# Patient Record
Sex: FEMALE | Race: BLACK OR AFRICAN AMERICAN | URBAN - METROPOLITAN AREA
[De-identification: names, ages, dates, MRNs, and addresses within clinical notes are randomized per-mention and may not be internally consistent; named-entity substitution may affect disease eponyms.]

---

## 2017-05-16 ENCOUNTER — ED HISTORICAL/CONVERTED ENCOUNTER (OUTPATIENT)
Dept: OTHER | Age: 22
End: 2017-05-16

## 2017-06-25 ENCOUNTER — ED HISTORICAL/CONVERTED ENCOUNTER (OUTPATIENT)
Dept: OTHER | Age: 22
End: 2017-06-25

## 2018-04-25 ENCOUNTER — ED HISTORICAL/CONVERTED ENCOUNTER (OUTPATIENT)
Dept: OTHER | Age: 23
End: 2018-04-25

## 2020-05-18 LAB
PAP SMEAR, EXTERNAL: NEGATIVE
PAP SMEAR, EXTERNAL: NORMAL

## 2020-05-27 ENCOUNTER — VIRTUAL VISIT (OUTPATIENT)
Dept: ENDOCRINOLOGY | Age: 25
End: 2020-05-27

## 2020-05-27 DIAGNOSIS — E11.65 TYPE 2 DIABETES MELLITUS WITH HYPERGLYCEMIA, UNSPECIFIED WHETHER LONG TERM INSULIN USE (HCC): ICD-10-CM

## 2020-05-27 DIAGNOSIS — O24.419 GESTATIONAL DIABETES MELLITUS (GDM) IN FIRST TRIMESTER, GESTATIONAL DIABETES METHOD OF CONTROL UNSPECIFIED: Primary | ICD-10-CM

## 2020-05-27 DIAGNOSIS — O24.419 GESTATIONAL DIABETES MELLITUS (GDM) IN FIRST TRIMESTER, GESTATIONAL DIABETES METHOD OF CONTROL UNSPECIFIED: ICD-10-CM

## 2020-05-27 RX ORDER — PYRIDOXINE HCL (VITAMIN B6) 25 MG
TABLET ORAL
COMMUNITY
Start: 2020-05-18 | End: 2020-08-06

## 2020-05-27 RX ORDER — PROMETHAZINE HYDROCHLORIDE 25 MG/1
TABLET ORAL
COMMUNITY
Start: 2020-05-18 | End: 2020-08-06

## 2020-05-27 NOTE — PATIENT INSTRUCTIONS
[facility] Do not skip meals Do not eat in between meals Reduce carbs- pasta, rice, potatoes, bread Do not drink juices or sodas, even they are calorie zero or diet drinks Do not eat peanut butter Do not eat sugar free cookies and cakes Do not eat peaches, oranges, pineapples, raisins, grapes , canteloupe , honey dew and fruit medleys 
 
 
----------------------------------------------------------------------------------------------------------------- 
 
 
[employer] reli-on meter   And testing supplies Check blood sugars immediately before each meal and at bedtime

## 2020-05-27 NOTE — PROGRESS NOTES
1. Have you been to the ER, urgent care clinic since your last visit? No  Hospitalized since your last visit? No    2. Have you seen or consulted any other health care providers outside of the [address] [place] [address] since your last visit? Include any pap smears or colon screening.  No

## 2020-05-27 NOTE — PROGRESS NOTES
HISTORY OF PRESENT ILLNESS  Becca Herrera is a 31 y.o. female. HPI  Initial visit for type 2 diabetes / gestational diabetes   Referred by Dr. Saloni Das by pcp in 2011   Saw me then and her last visit was June 2012     She is diagnosed as type 2 diabetes ,  Then and never followed back since   She continued her care under pcp     She is thru  8 weeks of gestational age   LMP     H/o diabetes for 8 plus  years     Current A1C is ??  and symptoms/problems include none     Current diabetic medications include none. Current monitoring regimen: home blood tests - none   Home blood sugar records: trend: unknown   Any episodes of hypoglycemia? no    Weight trend: increasing steadily  Prior visit with dietician: no  Current diet: \"unhealthy\" diet in general  Current exercise: no regular exercise    Known diabetic complications: ?  Cardiovascular risk factors: dyslipidemia, diabetes mellitus, obesity    Eye exam current (within one year): no  CHERI: no     Past Medical History:   Diagnosis Date    Diabetes mellitus     IDDM (insulin dependent diabetes mellitus)     Weight loss      History reviewed. No pertinent surgical history. Current Outpatient Medications   Medication Sig    promethazine (PHENERGAN) 25 mg tablet TAKE 1 TABLET BY MOUTH EVERY 4 TO 6 HOURS AS NEEDED    Vitamin B-6 25 mg tablet TAKE ONE TABLET BY MOUTH TWICE DAILY    Lancets (ONE TOUCH DELICA) Misc 949 Packages by Does Not Apply route four (4) times daily.  glucose blood VI test strips (ONE TOUCH TEST) strip 200 Each by Does Not Apply route four (4) times daily.  Insulin Needles, Disposable, (BD INSULIN PEN NEEDLE UF SHORT) 31 X 5/16 \" Ndle 200 Packages by SubCUTAneous route four (4) times daily. No current facility-administered medications for this visit. Review of Systems   Constitutional: Negative. HENT: Negative. Eyes: Negative for pain and redness. Respiratory: Negative.     Cardiovascular: Negative for chest pain, palpitations and leg swelling. Gastrointestinal: Negative. Negative for constipation. Genitourinary: Negative. Musculoskeletal: Negative for myalgias. Skin: Negative. Neurological: Negative. Endo/Heme/Allergies: Negative. Psychiatric/Behavioral: Negative for depression and memory loss. The patient does not have insomnia. Physical Exam   Constitutional: oriented to person, place, and time. appears well-developed and well-nourished. HENT:   Head: Normocephalic. Eyes: normal , noted no swelling or redness. Neck: Normal range of motion. Cardiovascular: could nto be examined  Pulmonary/Chest: appears breathing effortlessly  Abdominal: Soft. Musculoskeletal: appears relatively nprmal  range of motion. Neurological: He is alert and oriented to person, place, and time. Appears to have no focal deficits    Psychiatric: He has a normal mood and affect. ASSESSMENT and PLAN    1. Poorly controlled gestational diabetes : Diabetes type 2 complicating the pregnancy   Discussed patho-physiology of DM  during pregnancy   She is educated about the importance of glycemic control for healthy baby and safe delivery. She is told to check blood sugars before meals and one hour after meals and at bed time on some days (2 hours post dinner). She is educated about the targets being different during pregnancy   Fasting below 90 mg and one hour post prandial being below 130 mg   She will attend the DTC education class. She will maintain  her log/meter for review. She is educated about possibility of worsening of retinopathy with aggressive glycemic control.       2.   Type 2 DM, poorly controlled :   Suspecting poor control of DM   - ordered a1c ASAP   - diet education provided   - Discussed DM 2 patho-physiology extensively   - ordered meter asap and use it with keeping glucose log   - Patient is advised about checking blood sugars 4 times a day and maintaining log book  - f/u sooner     lab results and schedule of future lab studies reviewed with patient  specific diabetic recommendations: diabetic diet discussed in detail, written exchange diet given, home glucose monitoring emphasized and annual eye examinations at Ophthalmology discussed      3. Hypoglycemia :  Educated on treating the hypoglycemia. Discussed Glucagon use and prescribed        Pursuant  To the Emergency declaration under the [address] and the [place] [id number] waiver authority and the [facility], to reduce the patient's risk of exposure to  COVID-19 and provide continuity of care for an established patient.

## 2020-05-28 LAB
ALBUMIN SERPL-MCNC: 4.1 G/DL (ref 3.9–5)
ALBUMIN/GLOB SERPL: 1.5 {RATIO} (ref 1.2–2.2)
ALP SERPL-CCNC: 67 IU/L (ref 39–117)
ALT SERPL-CCNC: 11 IU/L (ref 0–32)
AST SERPL-CCNC: 9 IU/L (ref 0–40)
BILIRUB SERPL-MCNC: <0.2 MG/DL (ref 0–1.2)
BUN SERPL-MCNC: 5 MG/DL (ref 6–20)
BUN/CREAT SERPL: 10 (ref 9–23)
CALCIUM SERPL-MCNC: 9.5 MG/DL (ref 8.7–10.2)
CHLORIDE SERPL-SCNC: 104 MMOL/L (ref 96–106)
CO2 SERPL-SCNC: 19 MMOL/L (ref 20–29)
CREAT SERPL-MCNC: 0.51 MG/DL (ref 0.57–1)
EST. AVERAGE GLUCOSE BLD GHB EST-MCNC: 217 MG/DL
GLOBULIN SER CALC-MCNC: 2.7 G/DL (ref 1.5–4.5)
GLUCOSE SERPL-MCNC: 119 MG/DL (ref 65–99)
HBA1C MFR BLD: 9.2 % (ref 4.8–5.6)
POTASSIUM SERPL-SCNC: 4.3 MMOL/L (ref 3.5–5.2)
PROT SERPL-MCNC: 6.8 G/DL (ref 6–8.5)
SODIUM SERPL-SCNC: 136 MMOL/L (ref 134–144)
TSH SERPL DL<=0.005 MIU/L-ACNC: 3.66 UIU/ML (ref 0.45–4.5)

## 2020-06-07 ENCOUNTER — DOCUMENTATION ONLY (OUTPATIENT)
Dept: ENDOCRINOLOGY | Age: 25
End: 2020-06-07

## 2020-06-07 NOTE — PROGRESS NOTES
pregnant , diabetic,   She has out of control, a1c  Over 9 %   I was waiting to see her with log and she missed a visit already     She has to be very compliant with follow ups - inform her   I dont think she made an appt

## 2020-06-15 LAB
ANTIBODY SCREEN, EXTERNAL: NEGATIVE
CHLAMYDIA, EXTERNAL: NEGATIVE
CYSTIC FIBROSIS, EXTERNAL: NEGATIVE
HBSAG, EXTERNAL: NEGATIVE
HCT, EXTERNAL: NORMAL
HGB EVAL, EXTERNAL: NORMAL
HGB, EXTERNAL: 9.6
HIV, EXTERNAL: NORMAL
N. GONORRHEA, EXTERNAL: NEGATIVE
PLATELET CNT,   EXTERNAL: 468
RPR, EXTERNAL: NORMAL
RUBELLA, EXTERNAL: NORMAL
TYPE, ABO & RH, EXTERNAL: NORMAL
URINALYSIS, EXTERNAL: NEGATIVE

## 2020-06-16 ENCOUNTER — VIRTUAL VISIT (OUTPATIENT)
Dept: ENDOCRINOLOGY | Age: 25
End: 2020-06-16

## 2020-06-16 DIAGNOSIS — E11.65 TYPE 2 DIABETES MELLITUS WITH HYPERGLYCEMIA, UNSPECIFIED WHETHER LONG TERM INSULIN USE (HCC): ICD-10-CM

## 2020-06-16 DIAGNOSIS — O24.419 GESTATIONAL DIABETES MELLITUS (GDM) IN FIRST TRIMESTER, GESTATIONAL DIABETES METHOD OF CONTROL UNSPECIFIED: Primary | ICD-10-CM

## 2020-06-16 DIAGNOSIS — O24.419 GESTATIONAL DIABETES MELLITUS (GDM) IN FIRST TRIMESTER, GESTATIONAL DIABETES METHOD OF CONTROL UNSPECIFIED: ICD-10-CM

## 2020-06-16 RX ORDER — INSULIN DETEMIR 100 [IU]/ML
INJECTION, SOLUTION SUBCUTANEOUS
Qty: 15 ML | Refills: 6 | Status: SHIPPED | OUTPATIENT
Start: 2020-06-16 | End: 2020-11-21

## 2020-06-16 RX ORDER — PEN NEEDLE, DIABETIC 30 GX3/16"
200 NEEDLE, DISPOSABLE MISCELLANEOUS 4 TIMES DAILY
Qty: 200 PACKAGE | Refills: 6 | Status: SHIPPED | OUTPATIENT
Start: 2020-06-16 | End: 2020-12-29

## 2020-06-16 RX ORDER — INSULIN ASPART 100 [IU]/ML
INJECTION, SOLUTION INTRAVENOUS; SUBCUTANEOUS
Qty: 15 ML | Refills: 6 | Status: SHIPPED | OUTPATIENT
Start: 2020-06-16 | End: 2020-11-21

## 2020-06-16 NOTE — PROGRESS NOTES
**THIS IS A VIRTUAL VISIT VIA AUDIO- VIDEO SYNCHRONOUS DISCUSSION. PATIENT AGREED TO HAVE THEIR CARE DELIVERED OVER A MYCHART/DOXY. ME VIDEO VISIT IN PLACE OF THEIR REGULARLY SCHEDULED OFFICE VISIT**   Pt  is aware that this is a billable encounter and is responsible for copays/deductibles   Patient gave a verbal consent to proceed with virtual video visit   Patient is at home and I, the provider,  am at the office care diabetes and endocrinology    HISTORY OF PRESENT ILLNESS  Gerson Robles is a 31 y.o. female. HPI  First follow up after  Initial visit for type 2 diabetes / gestational diabetes   Referred by Dr. Za Perez       She is thru 12 weeks of    GA   Had the first visit with her OB     Sugars are better than prior to pregnancy , but not in range for pregnancy goals           Old history  :    Referred by pcp in 2011   Saw jovani patel and her last visit was June 2012     She is diagnosed as type 2 diabetes ,  Then and never followed back since   She continued her care under pcp     She is thru  8 weeks of gestational age   LMP     H/o diabetes for 8 plus  years     Current A1C is ??  and symptoms/problems include none     Current diabetic medications include none. Current monitoring regimen: home blood tests - none   Home blood sugar records: trend: unknown   Any episodes of hypoglycemia? no    Weight trend: increasing steadily  Prior visit with dietician: no  Current diet: \"unhealthy\" diet in general  Current exercise: no regular exercise    Known diabetic complications: ?  Cardiovascular risk factors: dyslipidemia, diabetes mellitus, obesity    Eye exam current (within one year): no  CHERI: no     Past Medical History:   Diagnosis Date    Diabetes mellitus     IDDM (insulin dependent diabetes mellitus)     Weight loss      History reviewed. No pertinent surgical history.   Current Outpatient Medications   Medication Sig    promethazine (PHENERGAN) 25 mg tablet TAKE 1 TABLET BY MOUTH EVERY 4 TO 6 HOURS AS NEEDED    Vitamin B-6 25 mg tablet TAKE ONE TABLET BY MOUTH TWICE DAILY    Lancets (ONE TOUCH DELICA) Misc 454 Packages by Does Not Apply route four (4) times daily.  glucose blood VI test strips (ONE TOUCH TEST) strip 200 Each by Does Not Apply route four (4) times daily.  Insulin Needles, Disposable, (BD INSULIN PEN NEEDLE UF SHORT) 31 X 5/16 \" Ndle 200 Packages by SubCUTAneous route four (4) times daily. No current facility-administered medications for this visit. Review of Systems   Constitutional: Negative. HENT: Negative. Eyes: Negative for pain and redness. Respiratory: Negative. Cardiovascular: Negative for chest pain, palpitations and leg swelling. Gastrointestinal: Negative. Negative for constipation. Genitourinary: Negative. Musculoskeletal: Negative for myalgias. Skin: Negative. Neurological: Negative. Endo/Heme/Allergies: Negative. Psychiatric/Behavioral: Negative for depression and memory loss. The patient does not have insomnia. Physical Exam   Constitutional: oriented to person, place, and time. appears well-developed and well-nourished. HENT:   Head: Normocephalic. Eyes: normal , noted no swelling or redness. Neck: Normal range of motion. Cardiovascular: could nto be examined  Pulmonary/Chest: appears breathing effortlessly  Abdominal: Soft. Musculoskeletal: appears relatively nprmal  range of motion. Neurological: He is alert and oriented to person, place, and time. Appears to have no focal deficits    Psychiatric: He has a normal mood and affect.          Lab Results   Component Value Date/Time    Hemoglobin A1c 9.2 (H) 05/27/2020 04:19 PM    Glucose 119 (H) 05/27/2020 04:19 PM    Glucose  06/11/2012 01:09 PM    Creatinine 0.51 (L) 05/27/2020 04:19 PM      No results found for: CHOL, CHOLPOCT, HDL, LDL, LDLC, LDLCPOC, LDLCEXT, TRIGL, TGLPOCT, CHHD, CHHDX  Lab Results   Component Value Date/Time    ALT (SGPT) 11 05/27/2020 04:19 PM    Alk. phosphatase 67 05/27/2020 04:19 PM    Bilirubin, total <0.2 05/27/2020 04:19 PM    Albumin 4.1 05/27/2020 04:19 PM    Protein, total 6.8 05/27/2020 04:19 PM     Lab Results   Component Value Date/Time    GFR est non- 05/27/2020 04:19 PM    GFR est  05/27/2020 04:19 PM    Creatinine 0.51 (L) 05/27/2020 04:19 PM    BUN 5 (L) 05/27/2020 04:19 PM    Sodium 136 05/27/2020 04:19 PM    Potassium 4.3 05/27/2020 04:19 PM    Chloride 104 05/27/2020 04:19 PM    CO2 19 (L) 05/27/2020 04:19 PM             ASSESSMENT and PLAN    1. Poorly controlled gestational diabetes : Diabetes type 2 complicating the pregnancy     a1c is 9.2 % on recent labs  Fasting glucose - 130 to 150   And  Pre meal  117  To 145   Will start on basal bolus regimen ,  She has experience in taking shots in the past when she got diagnosed   She is to check 6 times a day   Discussed with her the insulin regimen   Asked her to maintain the log   She will be picking up the instructions today and start insulin today June 16 2020   Will ser in f/u in person in a week   Discussed patho-physiology of DM  during pregnancy   She is educated about the importance of glycemic control for healthy baby and safe delivery. She is told to check blood sugars before meals and one hour after meals and at bed time on some days (2 hours post dinner). She is educated about the targets being different during pregnancy   Fasting below 90 mg and one hour post prandial being below 130 mg   She will be scheduled for the  DTC education class. She will maintain  her log/meter for review. She is educated about possibility of worsening of retinopathy with aggressive glycemic control.       2. Type 2 DM, poorly controlled : A1c was over 9 %          3. Hypoglycemia :  Educated on treating the hypoglycemia.  Discussed Glucagon use and prescribed        Pursuant  To the Emergency declaration under the [address] and the [facility] authority and the Coronavirus Preparedness, to reduce the patient's risk of exposure to  COVID-19 and provide continuity of care for an established patient.

## 2020-06-16 NOTE — PATIENT INSTRUCTIONS
[address] [facility] Do not skip meals Do not eat in between meals Reduce carbs- pasta, rice, potatoes, bread Do not drink juices or sodas, even they are calorie zero or diet drinks Do not eat peanut butter Do not eat sugar free cookies and cakes Do not eat peaches, oranges, pineapples, raisins, grapes , canteloupe , honey dew and fruit medleys 
 
 
------------------------------------------------------------------------------------------GO OVER INSTRUCTIONS WITH PT WHEN SHE COMES IN TO   
 
 
[employer] reli-on meter   And testing supplies Check blood sugars immediately before each meal and at bedtime ALSO CHECK 2 HOURS POST B-FAST AND LUNCH BUT DO NOT USE INSULIN FOR THESE CHECKS Take  NPH  /  levemir  insulin  24  units  at bed time Take novolog  Insulin 6 units before breakfast,    4  units before lunch and 4 units before dinner. Also, add additional Novolog  as follows with meals  If blood sugars are[de-identified] 
 
130 - 170   mg 2  units 171-  220 mg 4 units 221- 270 mg 6 units 271-320 mg  8 units 321- 370 mg 10  units 371- 420 mg 12 units Call office if it goes over 400 mg Less than 70 mg NO INSULIN

## 2020-06-16 NOTE — PROGRESS NOTES
1. Have you been to the ER, urgent care clinic since your last visit[place] [facility] [place]  Hospitalized since your last visit? No    2. Have you seen or consulted any other health care providers outside of the [address] [place] [address] since your last visit? Include any pap smears or colon screening.  No

## 2020-06-24 ENCOUNTER — OFFICE VISIT (OUTPATIENT)
Dept: ENDOCRINOLOGY | Age: 25
End: 2020-06-24

## 2020-06-24 VITALS
RESPIRATION RATE: 18 BRPM | TEMPERATURE: 98.8 F | OXYGEN SATURATION: 100 % | BODY MASS INDEX: 41.42 KG/M2 | HEART RATE: 93 BPM | HEIGHT: 64 IN | SYSTOLIC BLOOD PRESSURE: 126 MMHG | WEIGHT: 242.6 LBS | DIASTOLIC BLOOD PRESSURE: 66 MMHG

## 2020-06-24 DIAGNOSIS — E11.65 TYPE 2 DIABETES MELLITUS WITH HYPERGLYCEMIA, UNSPECIFIED WHETHER LONG TERM INSULIN USE (HCC): ICD-10-CM

## 2020-06-24 DIAGNOSIS — O24.414 INSULIN CONTROLLED GESTATIONAL DIABETES MELLITUS (GDM) IN FIRST TRIMESTER: Primary | ICD-10-CM

## 2020-06-24 LAB — HBA1C MFR BLD HPLC: 7.4 %

## 2020-06-24 NOTE — PATIENT INSTRUCTIONS
[facility] Do not skip meals Do not eat in between meals Reduce carbs- pasta, rice, potatoes, bread Do not drink juices or sodas, even they are calorie zero or diet drinks Do not eat peanut butter Do not eat sugar free cookies and cakes Do not eat peaches, oranges, pineapples, raisins, grapes , canteloupe , honey dew and fruit medleys 
 
 
------------------------------------------------------------------------------------------ 
[employer] reli[employer]on meter   And testing supplies Check blood sugars immediately before each meal and at bedtime ALSO CHECK 2 HOURS POST B-FAST AND LUNCH BUT DO NOT USE INSULIN FOR THESE CHECKS Take  NPH  /  levemir  insulin  24  units  at bed time Take novolog  Insulin 6 units before breakfast,    4  units before lunch and 4 units before dinner. Also, add additional Novolog  as follows with meals  If blood sugars are[de-identified] 
 
130 - 170   mg 2  units 171-  220 mg 4 units 221- 270 mg 6 units 271-320 mg  8 units 321- 370 mg 10  units 371- 420 mg 12 units Call office if it goes over 400 mg Less than 70 mg NO INSULIN

## 2020-06-24 NOTE — PROGRESS NOTES
1. Have you been to the ER, urgent care clinic since your last visit? No  Hospitalized since your last visit? No    2. Have you seen or consulted any other health care providers outside of the [address] [place] [address] since your last visit? Include any pap smears or colon screening. No    Wt Readings from Last 3 Encounters:   06/24/20 242 lb 9.6 oz (110 kg)   06/11/12 226 lb (102.5 kg) (99 %, Z= 2.26)*   11/04/11 222 lb 6.4 oz (100.9 kg) (99 %, Z= 2.23)*     * Growth percentiles are based on [facility] (Girls, 2-20 Years) data. Temp Readings from Last 3 Encounters:   06/24/20 98.8 °F (37.1 °C) (Oral)     BP Readings from Last 3 Encounters:   06/24/20 126/66   06/11/12 120/67   11/04/11 110/65 (47 %, Z = -0.09 /  46 %, Z = -0.10)*     *BP percentiles are based on the 2017 AAP Clinical Practice Guideline for girls     Pulse Readings from Last 3 Encounters:   06/24/20 93   06/11/12 75   11/04/11 77     Lab Results   Component Value Date/Time    Hemoglobin A1c 9.2 (H) 05/27/2020 04:19 PM    Hemoglobin A1c (POC) 6.3 11/04/2011 03:00 PM     Patient has logbook today.

## 2020-06-25 NOTE — PROGRESS NOTES
HISTORY OF PRESENT ILLNESS  Belen Jcakson is a 31 y.o. female. Second  follow up after  Initial visit for type 2 diabetes / gestational diabetes   Referred by Dr. Ave Lozano       She is thru 17 weeks of    GA   Due date in dec 2020    Sugars are definitely better since on insulin          Old history  :    Referred by pcp in 2011   Saw me then and her last visit was June 2012     She is diagnosed as type 2 diabetes ,  Then and never followed back since   She continued her care under pcp     She is thru  8 weeks of gestational age   LMP     H/o diabetes for 8 plus  years     Current A1C is ??  and symptoms/problems include none     Current diabetic medications include none. Current monitoring regimen: home blood tests - none   Home blood sugar records: trend: unknown   Any episodes of hypoglycemia? no    Weight trend: increasing steadily  Prior visit with dietician: no  Current diet: \"unhealthy\" diet in general  Current exercise: no regular exercise    Known diabetic complications: ?  Cardiovascular risk factors: dyslipidemia, diabetes mellitus, obesity    Eye exam current (within one year): no  CHERI: no     Past Medical History:   Diagnosis Date    Diabetes mellitus     IDDM (insulin dependent diabetes mellitus)     Weight loss      History reviewed. No pertinent surgical history. Current Outpatient Medications   Medication Sig    Insulin Needles, Disposable, (BD Ultra-Fine Short Pen Needle) 31 gauge x 5/16\" ndle 200 Packages by SubCUTAneous route four (4) times daily.  insulin detemir U-100 (Levemir FlexTouch U-100 Insuln) 100 unit/mL (3 mL) inpn Inject 24 units at bedtime    insulin aspart U-100 (NovoLOG Flexpen U-100 Insulin) 100 unit/mL (3 mL) inpn Inject 6 units before breakfast, and 4 units before lunch and dinner.  Plus sliding scale to max 50 units daily    promethazine (PHENERGAN) 25 mg tablet TAKE 1 TABLET BY MOUTH EVERY 4 TO 6 HOURS AS NEEDED    Vitamin B-6 25 mg tablet TAKE ONE TABLET BY MOUTH TWICE DAILY    Lancets (ONE TOUCH DELICA) Misc 590 Packages by Does Not Apply route four (4) times daily.  glucose blood VI test strips (ONE TOUCH TEST) strip 200 Each by Does Not Apply route four (4) times daily. No current facility-administered medications for this visit. Review of Systems   Constitutional: Negative. HENT: Negative. Eyes: Negative for pain and redness. Respiratory: Negative. Cardiovascular: Negative for chest pain, palpitations and leg swelling. Gastrointestinal: Negative. Negative for constipation. Genitourinary: Negative. Musculoskeletal: Negative for myalgias. Skin: Negative. Neurological: Negative. Endo/Heme/Allergies: Negative. Psychiatric/Behavioral: Negative for depression and memory loss. The patient does not have insomnia. Physical Exam   Constitutional: oriented to person, place, and time. appears well-developed and well-nourished. HENT:   Head: Normocephalic. Eyes: normal , noted no swelling or redness. Neck: Normal range of motion. Cardiovascular: could nto be examined  Pulmonary/Chest: appears breathing effortlessly  Abdominal: Soft. Musculoskeletal: appears relatively nprmal  range of motion. Neurological: He is alert and oriented to person, place, and time. Appears to have no focal deficits    Psychiatric: He has a normal mood and affect. Lab Results   Component Value Date/Time    Hemoglobin A1c 9.2 (H) 05/27/2020 04:19 PM    Glucose 119 (H) 05/27/2020 04:19 PM    Glucose  06/11/2012 01:09 PM    Creatinine 0.51 (L) 05/27/2020 04:19 PM      No results found for: CHOL, CHOLPOCT, HDL, LDL, LDLC, LDLCPOC, LDLCEXT, TRIGL, TGLPOCT, CHHD, CHHDX  Lab Results   Component Value Date/Time    ALT (SGPT) 11 05/27/2020 04:19 PM    Alk.  phosphatase 67 05/27/2020 04:19 PM    Bilirubin, total <0.2 05/27/2020 04:19 PM    Albumin 4.1 05/27/2020 04:19 PM    Protein, total 6.8 05/27/2020 04:19 PM     Lab Results   Component Value Date/Time    GFR est non- 05/27/2020 04:19 PM    GFR est  05/27/2020 04:19 PM    Creatinine 0.51 (L) 05/27/2020 04:19 PM    BUN 5 (L) 05/27/2020 04:19 PM    Sodium 136 05/27/2020 04:19 PM    Potassium 4.3 05/27/2020 04:19 PM    Chloride 104 05/27/2020 04:19 PM    CO2 19 (L) 05/27/2020 04:19 PM             ASSESSMENT and PLAN    1. Poorly controlled gestational diabetes : Diabetes type 2 complicating the pregnancy   a1c is 9.2 % on May labs   a1c is  By POC  7.4 %    Today  June 24 2020   Reviewed  Her log  Fasting glucose - mostly 90 to 110    And  Pre meal  117  To 130  Continuing on basal bolus regimen ,  She is to check 6 times a day   She will  maintain the log   She will be picking up the instructions today and start insulin today June 16 2020   Will ser in f/u in person in a week   Discussed patho-physiology of DM  during pregnancy   She is educated about the importance of glycemic control for healthy baby and safe delivery. She is told to check blood sugars before meals and one hour after meals and at bed time on some days (2 hours post dinner). She is educated about the targets being different during pregnancy   Fasting below 90 mg and one hour post prandial being below 130 mg     She will maintain  her log/meter for review. She is educated about possibility of worsening of retinopathy with aggressive glycemic control.       2.   Type 2 DM, poorly controlled : A1c was over 9 % around the time of conception   Pt is aware of  Risk inolved - maternal and fetal         > 50 % of time is spent on counseling   Patient voiced understanding her plan of care

## 2020-07-22 DIAGNOSIS — E11.65 TYPE 2 DIABETES MELLITUS WITH HYPERGLYCEMIA, UNSPECIFIED WHETHER LONG TERM INSULIN USE (HCC): ICD-10-CM

## 2020-07-22 DIAGNOSIS — O24.414 INSULIN CONTROLLED GESTATIONAL DIABETES MELLITUS (GDM) IN FIRST TRIMESTER: ICD-10-CM

## 2020-07-28 LAB
AFP ADJ MOM SERPL: 1.72
AFP INTERP SERPL-IMP: NORMAL
AFP INTERP SERPL-IMP: NORMAL
AFP SERPL-MCNC: 35.1 NG/ML
AGE AT DELIVERY: 26.9 YR
COMMENT, 018013: NORMAL
GA METHOD: NORMAL
GA: 15.7 WEEKS
IDDM PATIENT QL: YES
MULTIPLE PREGNANCY: NO
NEURAL TUBE DEFECT RISK FETUS: 926 %
RESULTS, 017004: NORMAL

## 2020-07-31 VITALS
SYSTOLIC BLOOD PRESSURE: 118 MMHG | BODY MASS INDEX: 43.62 KG/M2 | DIASTOLIC BLOOD PRESSURE: 68 MMHG | WEIGHT: 246.2 LBS | HEIGHT: 63 IN

## 2020-07-31 PROBLEM — Z34.90 PREGNANT: Status: ACTIVE | Noted: 2020-07-31

## 2020-07-31 PROBLEM — E28.2 POLYCYSTIC OVARY SYNDROME: Status: ACTIVE | Noted: 2020-07-31

## 2020-07-31 PROBLEM — O99.019 ANEMIA OF PREGNANCY: Status: ACTIVE | Noted: 2020-07-31

## 2020-07-31 PROBLEM — R11.2 NAUSEA AND VOMITING: Status: ACTIVE | Noted: 2020-07-31

## 2020-07-31 PROBLEM — A07.8 INTESTINAL INFECTION BY TRICHOMONAS VAGINALIS: Status: ACTIVE | Noted: 2020-07-31

## 2020-07-31 PROBLEM — Z67.91 RHD NEGATIVE: Status: ACTIVE | Noted: 2020-07-31

## 2020-07-31 RX ORDER — METRONIDAZOLE 500 MG/1
TABLET ORAL 3 TIMES DAILY
COMMUNITY
End: 2020-08-06

## 2020-07-31 RX ORDER — SYRINGE WITH NEEDLE, INSULIN
SYRINGE, EMPTY DISPOSABLE MISCELLANEOUS
COMMUNITY
End: 2021-01-22

## 2020-07-31 RX ORDER — LANOLIN ALCOHOL/MO/W.PET/CERES
CREAM (GRAM) TOPICAL
COMMUNITY
End: 2021-01-22

## 2020-07-31 RX ORDER — MUPIROCIN CALCIUM 20 MG/G
CREAM TOPICAL 2 TIMES DAILY
COMMUNITY
End: 2020-09-08

## 2020-07-31 RX ORDER — AMOXICILLIN 500 MG/1
500 CAPSULE ORAL
COMMUNITY
End: 2020-08-06

## 2020-07-31 RX ORDER — OXYCODONE AND ACETAMINOPHEN 5; 325 MG/1; MG/1
TABLET ORAL
COMMUNITY
End: 2020-08-06

## 2020-07-31 RX ORDER — TERCONAZOLE 8 MG/G
1 CREAM VAGINAL
COMMUNITY
End: 2020-08-06

## 2020-07-31 RX ORDER — DOCUSATE SODIUM 100 MG/1
100 CAPSULE, LIQUID FILLED ORAL 2 TIMES DAILY
COMMUNITY
End: 2020-12-29

## 2020-08-01 VITALS
BODY MASS INDEX: 43.59 KG/M2 | DIASTOLIC BLOOD PRESSURE: 68 MMHG | WEIGHT: 246 LBS | HEIGHT: 63 IN | SYSTOLIC BLOOD PRESSURE: 118 MMHG

## 2020-08-01 NOTE — PROGRESS NOTES
06/15/2020 12 wks 1 days [email or web address] 170 by u/s Present Yes Other (see comments) 0cm / 0% / -3 130/76 sitting 243.4 lbs With clothes   Comments: Fetal flutter. Denies LOF/VB. +Cramping-daily. SVE closed. Urine cx along with Ob panel today. Encouraged hydration. Per DM2, states Hba1c was 9% with her endocrinologist (Dr Marjorie Muñiz) 2 wks ago. MFM referral placed. Discussed she will likely need insulin management. Trich JENNIFER today. Partner was tested and is awaiting his results. She has not had unprotected intercourse since treatment. 07/10/2020 15 wks 5 days [email or web address] 170 by u/s Present No Other (see comments) 118/68 sitting 246.2 lbs With clothes   Comments: No FM. Denies LOF/VB/CTXs. Per DM, fasting FSGs- 100s, highest 115, 2hr PPs 120s. Saw endocrinologis and insulin was initiated. She is on Levemir 24u qhs and Novolog sliding scale, 6u qam, 4units at lunch/dinner. She has to send her updated log to the doctor today. Yet to get MFM appt. Nursing staff notified. AFP ordered. Anatomy scan will be with MFM. She does have some vaginal discharge since taking antibiotics for a toe infection. Will presumptively treat for yeast while awaiting culture results.

## 2020-08-06 ENCOUNTER — ROUTINE PRENATAL (OUTPATIENT)
Dept: OBGYN CLINIC | Age: 25
End: 2020-08-06
Payer: MEDICAID

## 2020-08-06 VITALS
WEIGHT: 254 LBS | BODY MASS INDEX: 45 KG/M2 | DIASTOLIC BLOOD PRESSURE: 74 MMHG | HEIGHT: 63 IN | SYSTOLIC BLOOD PRESSURE: 126 MMHG

## 2020-08-06 DIAGNOSIS — O99.012 ANEMIA DURING PREGNANCY IN SECOND TRIMESTER: ICD-10-CM

## 2020-08-06 DIAGNOSIS — Z67.91 RHD NEGATIVE: ICD-10-CM

## 2020-08-06 DIAGNOSIS — E11.9 TYPE 2 DIABETES MELLITUS WITHOUT COMPLICATION, UNSPECIFIED WHETHER LONG TERM INSULIN USE (HCC): ICD-10-CM

## 2020-08-06 DIAGNOSIS — O09.92 HIGH-RISK PREGNANCY IN SECOND TRIMESTER: ICD-10-CM

## 2020-08-06 DIAGNOSIS — A59.01 TRICHOMONAS VAGINALIS (TV) INFECTION: Primary | ICD-10-CM

## 2020-08-06 DIAGNOSIS — Z3A.19 19 WEEKS GESTATION OF PREGNANCY: ICD-10-CM

## 2020-08-06 PROCEDURE — 0502F SUBSEQUENT PRENATAL CARE: CPT | Performed by: OBSTETRICS & GYNECOLOGY

## 2020-08-06 NOTE — PROGRESS NOTES
AFP neg. Yet to feel fetal movement. Denies LOF/VB/CTXs. Had pelvic pressure that has since resolved. On same insulin regimen as before. Fasting FSGs 90s, < 95. 2HR PP 100s. Has MFM appt 8/10/2020. Will need to return JENNIFER in 1-2 wks for Trichomonas.

## 2020-08-10 ENCOUNTER — ROUTINE PRENATAL (OUTPATIENT)
Dept: OBGYN CLINIC | Age: 25
End: 2020-08-10
Payer: MEDICAID

## 2020-08-10 VITALS
SYSTOLIC BLOOD PRESSURE: 130 MMHG | BODY MASS INDEX: 45 KG/M2 | WEIGHT: 254 LBS | HEIGHT: 63 IN | DIASTOLIC BLOOD PRESSURE: 78 MMHG

## 2020-08-10 DIAGNOSIS — A59.01 TRICHOMONAS VAGINALIS (TV) INFECTION: Primary | ICD-10-CM

## 2020-08-10 PROCEDURE — 0502F SUBSEQUENT PRENATAL CARE: CPT | Performed by: OBSTETRICS & GYNECOLOGY

## 2020-08-10 NOTE — PROGRESS NOTES
Here for Trichomonas test of cure. Has completed medication (3-4 wks)  and so has partner. She has not had intercourse since then. She has seen MFM today. She states she notices her heart pounding sometimes. Denies fainting or dizziness or palpitations. Cont to monitor as heart rate nml for preg.

## 2020-08-14 LAB — T VAGINALIS DNA SPEC QL NAA+PROBE: NEGATIVE

## 2020-08-18 ENCOUNTER — TELEPHONE (OUTPATIENT)
Dept: OBGYN CLINIC | Age: 25
End: 2020-08-18

## 2020-08-18 NOTE — TELEPHONE ENCOUNTER
Called patient and she stated that she was having back pain and some cramping since she returned to work on Saturday. Advised patient to drink plenty of water and to take tylenol for pain. She also stated that she has started with a vaginal discharge. I informed her to call back to the office in the morning if not better so we can evaluate her and to make sure that she is not starting with an infection.

## 2020-09-08 ENCOUNTER — ROUTINE PRENATAL (OUTPATIENT)
Dept: OBGYN CLINIC | Age: 25
End: 2020-09-08
Payer: MEDICAID

## 2020-09-08 VITALS
HEIGHT: 63 IN | DIASTOLIC BLOOD PRESSURE: 80 MMHG | BODY MASS INDEX: 46.78 KG/M2 | WEIGHT: 264 LBS | HEART RATE: 102 BPM | SYSTOLIC BLOOD PRESSURE: 141 MMHG

## 2020-09-08 DIAGNOSIS — O09.92 HIGH-RISK PREGNANCY IN SECOND TRIMESTER: Primary | ICD-10-CM

## 2020-09-08 DIAGNOSIS — Z3A.24 24 WEEKS GESTATION OF PREGNANCY: ICD-10-CM

## 2020-09-08 DIAGNOSIS — E11.9 TYPE 2 DIABETES MELLITUS WITHOUT COMPLICATION, UNSPECIFIED WHETHER LONG TERM INSULIN USE (HCC): ICD-10-CM

## 2020-09-08 DIAGNOSIS — O99.012 ANEMIA DURING PREGNANCY IN SECOND TRIMESTER: ICD-10-CM

## 2020-09-08 DIAGNOSIS — Z67.91 RHD NEGATIVE: ICD-10-CM

## 2020-09-08 PROBLEM — A59.01 TRICHOMONAS VAGINALIS (TV) INFECTION: Status: RESOLVED | Noted: 2020-07-31 | Resolved: 2020-09-08

## 2020-09-08 PROCEDURE — 0502F SUBSEQUENT PRENATAL CARE: CPT | Performed by: OBSTETRICS & GYNECOLOGY

## 2020-09-08 NOTE — PROGRESS NOTES
+FM. Denies LOF/VB. Has some pelvic cramping when she goes to work at [employer] but it dissipates with rest. She will bring in accomodation paperwork. Per DM2, she is on ISS, Novolog 6u am, 4u lunch/dinner, Levemir 24u qhs. Fasting FSGs: 90s, max 100. 2hr PP: <120s. She has upcoming endocrine appt. Check CBC at next visit. Needs Rhogam at 28wga.

## 2020-09-16 ENCOUNTER — NURSE TRIAGE (OUTPATIENT)
Dept: OBGYN CLINIC | Age: 25
End: 2020-09-16

## 2020-10-08 ENCOUNTER — ROUTINE PRENATAL (OUTPATIENT)
Dept: OBGYN CLINIC | Age: 25
End: 2020-10-08
Payer: MEDICAID

## 2020-10-08 VITALS
WEIGHT: 267 LBS | HEIGHT: 63 IN | DIASTOLIC BLOOD PRESSURE: 76 MMHG | SYSTOLIC BLOOD PRESSURE: 132 MMHG | BODY MASS INDEX: 47.31 KG/M2 | HEART RATE: 102 BPM

## 2020-10-08 DIAGNOSIS — Z67.91 RHD NEGATIVE: ICD-10-CM

## 2020-10-08 DIAGNOSIS — O09.93 HIGH-RISK PREGNANCY IN THIRD TRIMESTER: ICD-10-CM

## 2020-10-08 DIAGNOSIS — Z79.4 TYPE 2 DIABETES MELLITUS WITHOUT COMPLICATION, WITH LONG-TERM CURRENT USE OF INSULIN (HCC): ICD-10-CM

## 2020-10-08 DIAGNOSIS — B37.31 YEAST VAGINITIS: ICD-10-CM

## 2020-10-08 DIAGNOSIS — O99.013 ANEMIA DURING PREGNANCY IN THIRD TRIMESTER: ICD-10-CM

## 2020-10-08 DIAGNOSIS — E11.9 TYPE 2 DIABETES MELLITUS WITHOUT COMPLICATION, WITH LONG-TERM CURRENT USE OF INSULIN (HCC): ICD-10-CM

## 2020-10-08 DIAGNOSIS — N89.8 VAGINAL DISCHARGE: ICD-10-CM

## 2020-10-08 DIAGNOSIS — Z34.93 PRENATAL CARE IN THIRD TRIMESTER: Primary | ICD-10-CM

## 2020-10-08 PROCEDURE — 96372 THER/PROPH/DIAG INJ SC/IM: CPT | Performed by: OBSTETRICS & GYNECOLOGY

## 2020-10-08 PROCEDURE — 0502F SUBSEQUENT PRENATAL CARE: CPT | Performed by: OBSTETRICS & GYNECOLOGY

## 2020-10-08 RX ORDER — ACETAMINOPHEN 500 MG
TABLET ORAL
COMMUNITY
Start: 2020-10-03 | End: 2020-12-29

## 2020-10-08 RX ORDER — TERCONAZOLE 4 MG/G
1 CREAM VAGINAL
Qty: 45 G | Refills: 2 | Status: SHIPPED | OUTPATIENT
Start: 2020-10-08 | End: 2020-10-15

## 2020-10-08 NOTE — PROGRESS NOTES
She complains of brown vaginal discharge x 1 month. +Vulvar swelling. +Vulvar pruritus. She has some vaginal burning and noticed some spots of blood last week post itching. Denies LOF/VB/CTXs. +FM. Speculum exam: thick yellow tinged discharge at os; os closed. Swab obtained. SVE closed. Per DM2, she is on ISS, Novolog 6u am, 4u lunch/dinner, Levemir 24u qhs. Fasting FSGs are 85 on average; 2hr PP 90-110s. She is taking iron. Will check CBC. She will get Tdap anf fluc vaccines at pharmacy and Rhogam in office today.

## 2020-10-09 LAB
BASOPHILS # BLD AUTO: 0 X10E3/UL (ref 0–0.2)
BASOPHILS NFR BLD AUTO: 0 %
EOSINOPHIL # BLD AUTO: 0.1 X10E3/UL (ref 0–0.4)
EOSINOPHIL NFR BLD AUTO: 2 %
ERYTHROCYTE [DISTWIDTH] IN BLOOD BY AUTOMATED COUNT: 14.5 % (ref 11.7–15.4)
HCT VFR BLD AUTO: 30.9 % (ref 34–46.6)
HGB BLD-MCNC: 9.8 G/DL (ref 11.1–15.9)
IMM GRANULOCYTES # BLD AUTO: 0 X10E3/UL (ref 0–0.1)
IMM GRANULOCYTES NFR BLD AUTO: 0 %
LYMPHOCYTES # BLD AUTO: 2.4 X10E3/UL (ref 0.7–3.1)
LYMPHOCYTES NFR BLD AUTO: 25 %
MCH RBC QN AUTO: 23 PG (ref 26.6–33)
MCHC RBC AUTO-ENTMCNC: 31.7 G/DL (ref 31.5–35.7)
MCV RBC AUTO: 72 FL (ref 79–97)
MONOCYTES # BLD AUTO: 0.7 X10E3/UL (ref 0.1–0.9)
MONOCYTES NFR BLD AUTO: 7 %
NEUTROPHILS # BLD AUTO: 6.4 X10E3/UL (ref 1.4–7)
NEUTROPHILS NFR BLD AUTO: 66 %
PLATELET # BLD AUTO: 448 X10E3/UL (ref 150–450)
RBC # BLD AUTO: 4.27 X10E6/UL (ref 3.77–5.28)
WBC # BLD AUTO: 9.6 X10E3/UL (ref 3.4–10.8)

## 2020-10-13 LAB
A VAGINAE DNA VAG QL NAA+PROBE: ABNORMAL SCORE
BVAB2 DNA VAG QL NAA+PROBE: ABNORMAL SCORE
C ALBICANS DNA VAG QL NAA+PROBE: POSITIVE
C GLABRATA DNA VAG QL NAA+PROBE: NEGATIVE
C KRUSEI DNA VAG QL NAA+PROBE: NEGATIVE
C LUSITANIAE DNA VAG QL NAA+PROBE: NEGATIVE
C TRACH DNA VAG QL NAA+PROBE: NEGATIVE
CANDIDA DNA VAG QL NAA+PROBE: NEGATIVE
MEGA1 DNA VAG QL NAA+PROBE: ABNORMAL SCORE
N GONORRHOEA DNA VAG QL NAA+PROBE: NEGATIVE
T VAGINALIS DNA VAG QL NAA+PROBE: NEGATIVE

## 2020-10-19 ENCOUNTER — NURSE TRIAGE (OUTPATIENT)
Dept: OBGYN CLINIC | Age: 25
End: 2020-10-19

## 2020-10-19 ENCOUNTER — TELEPHONE (OUTPATIENT)
Dept: OBGYN CLINIC | Age: 25
End: 2020-10-19

## 2020-10-19 RX ORDER — TERCONAZOLE 8 MG/G
1 CREAM VAGINAL
Qty: 20 G | Refills: 2 | Status: SHIPPED | OUTPATIENT
Start: 2020-10-19 | End: 2020-10-22

## 2020-10-19 NOTE — TELEPHONE ENCOUNTER
I returned patient's call and she was told that swab is showing some yeast.  I told her that Dr Dallin Powers has yet to review these results and she will receive a call for treatment instruction.   Patient told me that Dr Dallin Powers has already sent in [place] [phone] for her yeast.

## 2020-10-22 ENCOUNTER — ROUTINE PRENATAL (OUTPATIENT)
Dept: OBGYN CLINIC | Age: 25
End: 2020-10-22
Payer: MEDICAID

## 2020-10-22 VITALS
HEIGHT: 63 IN | SYSTOLIC BLOOD PRESSURE: 143 MMHG | WEIGHT: 271 LBS | DIASTOLIC BLOOD PRESSURE: 77 MMHG | HEART RATE: 98 BPM | BODY MASS INDEX: 48.02 KG/M2

## 2020-10-22 DIAGNOSIS — Z79.4 TYPE 2 DIABETES MELLITUS WITHOUT COMPLICATION, WITH LONG-TERM CURRENT USE OF INSULIN (HCC): ICD-10-CM

## 2020-10-22 DIAGNOSIS — Z3A.30 30 WEEKS GESTATION OF PREGNANCY: ICD-10-CM

## 2020-10-22 DIAGNOSIS — O09.93 HIGH-RISK PREGNANCY IN THIRD TRIMESTER: Primary | ICD-10-CM

## 2020-10-22 DIAGNOSIS — E11.9 TYPE 2 DIABETES MELLITUS WITHOUT COMPLICATION, WITH LONG-TERM CURRENT USE OF INSULIN (HCC): ICD-10-CM

## 2020-10-22 DIAGNOSIS — R03.0 ELEVATED BLOOD PRESSURE READING WITHOUT DIAGNOSIS OF HYPERTENSION: ICD-10-CM

## 2020-10-22 PROCEDURE — 0502F SUBSEQUENT PRENATAL CARE: CPT | Performed by: OBSTETRICS & GYNECOLOGY

## 2020-10-22 NOTE — PROGRESS NOTES
+FM. Denies LOF/VB/CTXs. Per DM2, she is on ISS, Novolog 6u am, 4u lunch/dinner, Levemir 24u qhs. Fasting FSGs are 100 on average; 2hr PP 120s or less. She states MFM said baby is measuring large for gestational age, awaiting report. They are trying change in diet and exercise before adjusting dosing of insulin. S/p Rhogam. S/p flu and Tdap vaccine. She states MFM said baby is measuring large for gestational age, awaiting report. Pt may be developing HTN. Will monitor.

## 2020-10-25 PROBLEM — O36.63X0 EXCESSIVE FETAL GROWTH AFFECTING MANAGEMENT OF PREGNANCY IN THIRD TRIMESTER: Status: ACTIVE | Noted: 2020-10-25

## 2020-11-06 ENCOUNTER — TELEPHONE (OUTPATIENT)
Dept: OBGYN CLINIC | Age: 25
End: 2020-11-06

## 2020-11-06 NOTE — TELEPHONE ENCOUNTER
She came in for an appointment and wanted to let Dr. Leonila Hopper know that they changed her Insulin no longer on [place] and now on NPH [place] 40 units in the morning and 40 units at night time.

## 2020-11-19 ENCOUNTER — ROUTINE PRENATAL (OUTPATIENT)
Dept: OBGYN CLINIC | Age: 25
End: 2020-11-19
Payer: MEDICAID

## 2020-11-19 VITALS
WEIGHT: 288 LBS | BODY MASS INDEX: 51.03 KG/M2 | SYSTOLIC BLOOD PRESSURE: 136 MMHG | DIASTOLIC BLOOD PRESSURE: 78 MMHG | HEIGHT: 63 IN | HEART RATE: 98 BPM

## 2020-11-19 DIAGNOSIS — Z34.93 PRENATAL CARE IN THIRD TRIMESTER: Primary | ICD-10-CM

## 2020-11-19 DIAGNOSIS — E11.9 TYPE 2 DIABETES MELLITUS WITHOUT COMPLICATION, WITH LONG-TERM CURRENT USE OF INSULIN (HCC): ICD-10-CM

## 2020-11-19 DIAGNOSIS — Z3A.34 34 WEEKS GESTATION OF PREGNANCY: ICD-10-CM

## 2020-11-19 DIAGNOSIS — Z79.4 TYPE 2 DIABETES MELLITUS WITHOUT COMPLICATION, WITH LONG-TERM CURRENT USE OF INSULIN (HCC): ICD-10-CM

## 2020-11-19 DIAGNOSIS — O13.3 GESTATIONAL HYPERTENSION, THIRD TRIMESTER: ICD-10-CM

## 2020-11-19 DIAGNOSIS — O09.93 HIGH-RISK PREGNANCY IN THIRD TRIMESTER: ICD-10-CM

## 2020-11-19 PROBLEM — R03.0 ELEVATED BLOOD PRESSURE READING WITHOUT DIAGNOSIS OF HYPERTENSION: Status: RESOLVED | Noted: 2020-10-22 | Resolved: 2020-11-19

## 2020-11-19 PROCEDURE — 0502F SUBSEQUENT PRENATAL CARE: CPT | Performed by: OBSTETRICS & GYNECOLOGY

## 2020-11-19 NOTE — PROGRESS NOTES
Per DM2, now on Nph 45/45 U Q AM AND QHS. NOVOLOG 6U/4U/6U FOR BR/LUNC/DINNER. Fasting fsgs are 100-115, occasional 90s. 2hr -110s. Has MFM appt this afternoon and EFW will be obtained. Mild range BP again today. Tox labs ordered for baseline labs. Possible gestational hypertension. +FM. Denies LOF/VB. Occasional contractions. GBS culture GCT testing at next visit.

## 2020-11-20 LAB
ALBUMIN SERPL-MCNC: 3.6 G/DL (ref 3.9–5)
ALBUMIN/GLOB SERPL: 1.3 {RATIO} (ref 1.2–2.2)
ALP SERPL-CCNC: 133 IU/L (ref 39–117)
ALT SERPL-CCNC: 7 IU/L (ref 0–32)
AST SERPL-CCNC: 9 IU/L (ref 0–40)
BASOPHILS # BLD AUTO: 0.1 X10E3/UL (ref 0–0.2)
BASOPHILS NFR BLD AUTO: 1 %
BILIRUB SERPL-MCNC: <0.2 MG/DL (ref 0–1.2)
BUN SERPL-MCNC: 5 MG/DL (ref 6–20)
BUN/CREAT SERPL: 9 (ref 9–23)
CALCIUM SERPL-MCNC: 9.4 MG/DL (ref 8.7–10.2)
CHLORIDE SERPL-SCNC: 105 MMOL/L (ref 96–106)
CO2 SERPL-SCNC: 18 MMOL/L (ref 20–29)
CREAT SERPL-MCNC: 0.58 MG/DL (ref 0.57–1)
EOSINOPHIL # BLD AUTO: 0.1 X10E3/UL (ref 0–0.4)
EOSINOPHIL NFR BLD AUTO: 1 %
ERYTHROCYTE [DISTWIDTH] IN BLOOD BY AUTOMATED COUNT: 15.8 % (ref 11.7–15.4)
GLOBULIN SER CALC-MCNC: 2.8 G/DL (ref 1.5–4.5)
GLUCOSE SERPL-MCNC: 107 MG/DL (ref 65–99)
HCT VFR BLD AUTO: 32 % (ref 34–46.6)
HGB BLD-MCNC: 10.1 G/DL (ref 11.1–15.9)
HIV 1+2 AB+HIV1 P24 AG SERPL QL IA: NON REACTIVE
IMM GRANULOCYTES # BLD AUTO: 0 X10E3/UL (ref 0–0.1)
IMM GRANULOCYTES NFR BLD AUTO: 0 %
LYMPHOCYTES # BLD AUTO: 2.4 X10E3/UL (ref 0.7–3.1)
LYMPHOCYTES NFR BLD AUTO: 23 %
MCH RBC QN AUTO: 22.4 PG (ref 26.6–33)
MCHC RBC AUTO-ENTMCNC: 31.6 G/DL (ref 31.5–35.7)
MCV RBC AUTO: 71 FL (ref 79–97)
MONOCYTES # BLD AUTO: 1.1 X10E3/UL (ref 0.1–0.9)
MONOCYTES NFR BLD AUTO: 11 %
NEUTROPHILS # BLD AUTO: 6.6 X10E3/UL (ref 1.4–7)
NEUTROPHILS NFR BLD AUTO: 64 %
PLATELET # BLD AUTO: 425 X10E3/UL (ref 150–450)
POTASSIUM SERPL-SCNC: 4.1 MMOL/L (ref 3.5–5.2)
PROT SERPL-MCNC: 6.4 G/DL (ref 6–8.5)
RBC # BLD AUTO: 4.51 X10E6/UL (ref 3.77–5.28)
SODIUM SERPL-SCNC: 136 MMOL/L (ref 134–144)
WBC # BLD AUTO: 10.3 X10E3/UL (ref 3.4–10.8)

## 2020-11-21 RX ORDER — INSULIN ASPART 100 [IU]/ML
INJECTION, SOLUTION INTRAVENOUS; SUBCUTANEOUS
Qty: 15 ML | Refills: 6
Start: 2020-11-21 | End: 2020-12-11

## 2020-11-21 RX ORDER — INSULIN DETEMIR 100 [IU]/ML
INJECTION, SOLUTION SUBCUTANEOUS
Qty: 15 ML | Refills: 6
Start: 2020-11-21 | End: 2020-12-11

## 2020-11-23 NOTE — PROGRESS NOTES
PORTAL MESSAGE  Galdino Pereira, your blood count has improved. The anemia improved but keep taking the iron. The labs I have back so far in regards to the blood pressures are normal- your liver and kidney are functioning well.

## 2020-12-07 ENCOUNTER — TELEPHONE (OUTPATIENT)
Dept: OBGYN CLINIC | Age: 25
End: 2020-12-07

## 2020-12-07 NOTE — TELEPHONE ENCOUNTER
Pt called had some mucous/ discharge once. No leaking of fluid, bleeding or regular contractions. Told if she has any above symptoms to call back.

## 2020-12-08 ENCOUNTER — ROUTINE PRENATAL (OUTPATIENT)
Dept: OBGYN CLINIC | Age: 25
End: 2020-12-08
Payer: MEDICAID

## 2020-12-08 ENCOUNTER — HOSPITAL ENCOUNTER (INPATIENT)
Age: 25
LOS: 3 days | Discharge: HOME | DRG: 540 | End: 2020-12-11
Attending: OBSTETRICS & GYNECOLOGY | Admitting: OBSTETRICS & GYNECOLOGY
Payer: MEDICAID

## 2020-12-08 ENCOUNTER — ANESTHESIA EVENT (OUTPATIENT)
Dept: LABOR AND DELIVERY | Age: 25
DRG: 540 | End: 2020-12-08
Payer: MEDICAID

## 2020-12-08 ENCOUNTER — ANESTHESIA (OUTPATIENT)
Dept: LABOR AND DELIVERY | Age: 25
DRG: 540 | End: 2020-12-08
Payer: MEDICAID

## 2020-12-08 VITALS
DIASTOLIC BLOOD PRESSURE: 79 MMHG | HEIGHT: 63 IN | SYSTOLIC BLOOD PRESSURE: 143 MMHG | HEART RATE: 102 BPM | BODY MASS INDEX: 51.91 KG/M2 | WEIGHT: 293 LBS

## 2020-12-08 DIAGNOSIS — Z79.4 TYPE 2 DIABETES MELLITUS WITHOUT COMPLICATION, WITH LONG-TERM CURRENT USE OF INSULIN (HCC): ICD-10-CM

## 2020-12-08 DIAGNOSIS — O09.93 HIGH-RISK PREGNANCY IN THIRD TRIMESTER: ICD-10-CM

## 2020-12-08 DIAGNOSIS — Z13.83 SCREENING FOR RESPIRATORY CONDITION: ICD-10-CM

## 2020-12-08 DIAGNOSIS — Z3A.37 37 WEEKS GESTATION OF PREGNANCY: ICD-10-CM

## 2020-12-08 DIAGNOSIS — O36.63X0 EXCESSIVE FETAL GROWTH AFFECTING MANAGEMENT OF PREGNANCY IN THIRD TRIMESTER, SINGLE OR UNSPECIFIED FETUS: ICD-10-CM

## 2020-12-08 DIAGNOSIS — E11.9 TYPE 2 DIABETES MELLITUS WITHOUT COMPLICATION, WITH LONG-TERM CURRENT USE OF INSULIN (HCC): ICD-10-CM

## 2020-12-08 DIAGNOSIS — O13.3 GESTATIONAL HYPERTENSION, THIRD TRIMESTER: Primary | ICD-10-CM

## 2020-12-08 PROBLEM — O99.213 OBESITY AFFECTING PREGNANCY IN THIRD TRIMESTER: Status: ACTIVE | Noted: 2020-12-08

## 2020-12-08 LAB
BASOPHILS # BLD: 0 K/UL (ref 0–0.1)
BASOPHILS NFR BLD: 0 % (ref 0–1)
COVID-19 RAPID TEST, COVR: NOT DETECTED
DIFFERENTIAL METHOD BLD: ABNORMAL
EOSINOPHIL # BLD: 0.1 K/UL (ref 0–0.4)
EOSINOPHIL NFR BLD: 1 % (ref 0–7)
ERYTHROCYTE [DISTWIDTH] IN BLOOD BY AUTOMATED COUNT: 16.4 % (ref 11.5–14.5)
GLUCOSE BLD STRIP.AUTO-MCNC: 129 MG/DL (ref 65–100)
GLUCOSE BLD STRIP.AUTO-MCNC: 173 MG/DL (ref 65–100)
GLUCOSE BLD STRIP.AUTO-MCNC: 83 MG/DL (ref 65–100)
HCT VFR BLD AUTO: 30.4 % (ref 35–47)
HGB BLD-MCNC: 9.8 G/DL (ref 11.5–16)
IMM GRANULOCYTES # BLD AUTO: 0 K/UL (ref 0–0.04)
IMM GRANULOCYTES NFR BLD AUTO: 0 % (ref 0–0.5)
LYMPHOCYTES # BLD: 2.9 K/UL (ref 0.8–3.5)
LYMPHOCYTES NFR BLD: 27 % (ref 12–49)
MCH RBC QN AUTO: 22.7 PG (ref 26–34)
MCHC RBC AUTO-ENTMCNC: 32.2 G/DL (ref 30–36.5)
MCV RBC AUTO: 70.5 FL (ref 80–99)
MONOCYTES # BLD: 1.1 K/UL (ref 0–1)
MONOCYTES NFR BLD: 10 % (ref 5–13)
NEUTS SEG # BLD: 6.7 K/UL (ref 1.8–8)
NEUTS SEG NFR BLD: 62 % (ref 32–75)
NRBC # BLD: 0 K/UL (ref 0–0.01)
NRBC BLD-RTO: 0 PER 100 WBC
PERFORMED BY, TECHID: ABNORMAL
PERFORMED BY, TECHID: ABNORMAL
PERFORMED BY, TECHID: NORMAL
PLATELET # BLD AUTO: 407 K/UL (ref 150–400)
PMV BLD AUTO: 11.3 FL (ref 8.9–12.9)
RBC # BLD AUTO: 4.31 M/UL (ref 3.8–5.2)
SARS-COV-2, COV2: NORMAL
SPECIMEN SOURCE: NORMAL
WBC # BLD AUTO: 10.8 K/UL (ref 3.6–11)

## 2020-12-08 PROCEDURE — 74011250636 HC RX REV CODE- 250/636: Performed by: OBSTETRICS & GYNECOLOGY

## 2020-12-08 PROCEDURE — 74011250636 HC RX REV CODE- 250/636: Performed by: NURSE ANESTHETIST, CERTIFIED REGISTERED

## 2020-12-08 PROCEDURE — 3E0R3BZ INTRODUCTION OF ANESTHETIC AGENT INTO SPINAL CANAL, PERCUTANEOUS APPROACH: ICD-10-PCS | Performed by: OBSTETRICS & GYNECOLOGY

## 2020-12-08 PROCEDURE — 74011250636 HC RX REV CODE- 250/636

## 2020-12-08 PROCEDURE — 74011000258 HC RX REV CODE- 258: Performed by: NURSE ANESTHETIST, CERTIFIED REGISTERED

## 2020-12-08 PROCEDURE — 87641 MR-STAPH DNA AMP PROBE: CPT

## 2020-12-08 PROCEDURE — 36415 COLL VENOUS BLD VENIPUNCTURE: CPT

## 2020-12-08 PROCEDURE — 76060000078 HC EPIDURAL ANESTHESIA: Performed by: OBSTETRICS & GYNECOLOGY

## 2020-12-08 PROCEDURE — 76010000392 HC C SECN EA ADDL 0.5 HR: Performed by: OBSTETRICS & GYNECOLOGY

## 2020-12-08 PROCEDURE — 82962 GLUCOSE BLOOD TEST: CPT

## 2020-12-08 PROCEDURE — 0502F SUBSEQUENT PRENATAL CARE: CPT | Performed by: OBSTETRICS & GYNECOLOGY

## 2020-12-08 PROCEDURE — 74011000250 HC RX REV CODE- 250: Performed by: ANESTHESIOLOGY

## 2020-12-08 PROCEDURE — 85025 COMPLETE CBC W/AUTO DIFF WBC: CPT

## 2020-12-08 PROCEDURE — 65410000002 HC RM PRIVATE OB

## 2020-12-08 PROCEDURE — 76010000391 HC C SECN FIRST 1 HR: Performed by: OBSTETRICS & GYNECOLOGY

## 2020-12-08 PROCEDURE — 59025 FETAL NON-STRESS TEST: CPT | Performed by: OBSTETRICS & GYNECOLOGY

## 2020-12-08 PROCEDURE — 87635 SARS-COV-2 COVID-19 AMP PRB: CPT

## 2020-12-08 PROCEDURE — 74011250637 HC RX REV CODE- 250/637

## 2020-12-08 PROCEDURE — 76060000033 HC ANESTHESIA 1 TO 1.5 HR: Performed by: OBSTETRICS & GYNECOLOGY

## 2020-12-08 PROCEDURE — 74011250636 HC RX REV CODE- 250/636: Performed by: ANESTHESIOLOGY

## 2020-12-08 PROCEDURE — 74011000250 HC RX REV CODE- 250: Performed by: NURSE ANESTHETIST, CERTIFIED REGISTERED

## 2020-12-08 RX ORDER — KETOROLAC TROMETHAMINE 30 MG/ML
INJECTION, SOLUTION INTRAMUSCULAR; INTRAVENOUS AS NEEDED
Status: DISCONTINUED | OUTPATIENT
Start: 2020-12-08 | End: 2020-12-08 | Stop reason: HOSPADM

## 2020-12-08 RX ORDER — ACETAMINOPHEN 325 MG/1
650 TABLET ORAL
Status: DISCONTINUED | OUTPATIENT
Start: 2020-12-08 | End: 2020-12-11 | Stop reason: HOSPADM

## 2020-12-08 RX ORDER — MORPHINE SULFATE 0.5 MG/ML
INJECTION, SOLUTION EPIDURAL; INTRATHECAL; INTRAVENOUS
Status: SHIPPED | OUTPATIENT
Start: 2020-12-08 | End: 2020-12-08

## 2020-12-08 RX ORDER — PEN NEEDLE, DIABETIC 31 GX5/16"
NEEDLE, DISPOSABLE MISCELLANEOUS
COMMUNITY
Start: 2020-11-19 | End: 2020-12-11

## 2020-12-08 RX ORDER — SODIUM CHLORIDE 0.9 % (FLUSH) 0.9 %
5-40 SYRINGE (ML) INJECTION EVERY 8 HOURS
Status: DISCONTINUED | OUTPATIENT
Start: 2020-12-08 | End: 2020-12-11 | Stop reason: HOSPADM

## 2020-12-08 RX ORDER — OXYTOCIN/0.9 % SODIUM CHLORIDE 30/500 ML
87.3 PLASTIC BAG, INJECTION (ML) INTRAVENOUS AS NEEDED
Status: DISCONTINUED | OUTPATIENT
Start: 2020-12-08 | End: 2020-12-11 | Stop reason: HOSPADM

## 2020-12-08 RX ORDER — OXYTOCIN/0.9 % SODIUM CHLORIDE 30/500 ML
87.3 PLASTIC BAG, INJECTION (ML) INTRAVENOUS AS NEEDED
Status: COMPLETED | OUTPATIENT
Start: 2020-12-08 | End: 2020-12-09

## 2020-12-08 RX ORDER — NALOXONE HYDROCHLORIDE 0.4 MG/ML
0.4 INJECTION, SOLUTION INTRAMUSCULAR; INTRAVENOUS; SUBCUTANEOUS AS NEEDED
Status: DISCONTINUED | OUTPATIENT
Start: 2020-12-08 | End: 2020-12-11 | Stop reason: HOSPADM

## 2020-12-08 RX ORDER — OXYTOCIN 10 [USP'U]/ML
INJECTION, SOLUTION INTRAMUSCULAR; INTRAVENOUS AS NEEDED
Status: DISCONTINUED | OUTPATIENT
Start: 2020-12-08 | End: 2020-12-08 | Stop reason: HOSPADM

## 2020-12-08 RX ORDER — ONDANSETRON 4 MG/1
4 TABLET, ORALLY DISINTEGRATING ORAL
Status: DISCONTINUED | OUTPATIENT
Start: 2020-12-08 | End: 2020-12-11 | Stop reason: HOSPADM

## 2020-12-08 RX ORDER — LABETALOL 200 MG/1
200 TABLET, FILM COATED ORAL 2 TIMES DAILY
Status: DISCONTINUED | OUTPATIENT
Start: 2020-12-09 | End: 2020-12-11 | Stop reason: HOSPADM

## 2020-12-08 RX ORDER — ZOLPIDEM TARTRATE 5 MG/1
5 TABLET ORAL
Status: DISCONTINUED | OUTPATIENT
Start: 2020-12-08 | End: 2020-12-11 | Stop reason: HOSPADM

## 2020-12-08 RX ORDER — SODIUM CHLORIDE 0.9 % (FLUSH) 0.9 %
5-40 SYRINGE (ML) INJECTION AS NEEDED
Status: DISCONTINUED | OUTPATIENT
Start: 2020-12-08 | End: 2020-12-11 | Stop reason: HOSPADM

## 2020-12-08 RX ORDER — DEXAMETHASONE SODIUM PHOSPHATE 4 MG/ML
INJECTION, SOLUTION INTRA-ARTICULAR; INTRALESIONAL; INTRAMUSCULAR; INTRAVENOUS; SOFT TISSUE
Status: COMPLETED
Start: 2020-12-08 | End: 2020-12-08

## 2020-12-08 RX ORDER — BUPIVACAINE HYDROCHLORIDE 7.5 MG/ML
INJECTION, SOLUTION EPIDURAL; RETROBULBAR
Status: SHIPPED | OUTPATIENT
Start: 2020-12-08 | End: 2020-12-08

## 2020-12-08 RX ORDER — SODIUM CHLORIDE, SODIUM LACTATE, POTASSIUM CHLORIDE, CALCIUM CHLORIDE 600; 310; 30; 20 MG/100ML; MG/100ML; MG/100ML; MG/100ML
125 INJECTION, SOLUTION INTRAVENOUS CONTINUOUS
Status: DISCONTINUED | OUTPATIENT
Start: 2020-12-08 | End: 2020-12-11 | Stop reason: HOSPADM

## 2020-12-08 RX ORDER — OXYTOCIN/RINGER'S LACTATE 30/500 ML
10 PLASTIC BAG, INJECTION (ML) INTRAVENOUS AS NEEDED
Status: DISCONTINUED | OUTPATIENT
Start: 2020-12-08 | End: 2020-12-11 | Stop reason: HOSPADM

## 2020-12-08 RX ORDER — PENICILLIN G 3000000 [IU]/50ML
3 INJECTION, SOLUTION INTRAVENOUS EVERY 4 HOURS
Status: DISCONTINUED | OUTPATIENT
Start: 2020-12-09 | End: 2020-12-09

## 2020-12-08 RX ORDER — OXYCODONE HYDROCHLORIDE 5 MG/1
5 TABLET ORAL
Status: DISCONTINUED | OUTPATIENT
Start: 2020-12-08 | End: 2020-12-11 | Stop reason: HOSPADM

## 2020-12-08 RX ORDER — SIMETHICONE 80 MG
80 TABLET,CHEWABLE ORAL AS NEEDED
Status: DISCONTINUED | OUTPATIENT
Start: 2020-12-08 | End: 2020-12-11 | Stop reason: HOSPADM

## 2020-12-08 RX ORDER — FAMOTIDINE 10 MG/ML
INJECTION INTRAVENOUS
Status: COMPLETED
Start: 2020-12-08 | End: 2020-12-08

## 2020-12-08 RX ORDER — SODIUM CHLORIDE, SODIUM LACTATE, POTASSIUM CHLORIDE, CALCIUM CHLORIDE 600; 310; 30; 20 MG/100ML; MG/100ML; MG/100ML; MG/100ML
INJECTION, SOLUTION INTRAVENOUS
Status: DISCONTINUED | OUTPATIENT
Start: 2020-12-08 | End: 2020-12-08 | Stop reason: HOSPADM

## 2020-12-08 RX ORDER — DIPHENHYDRAMINE HCL 25 MG
25 CAPSULE ORAL
Status: DISCONTINUED | OUTPATIENT
Start: 2020-12-08 | End: 2020-12-11 | Stop reason: HOSPADM

## 2020-12-08 RX ORDER — NALOXONE HYDROCHLORIDE 0.4 MG/ML
0.4 INJECTION, SOLUTION INTRAMUSCULAR; INTRAVENOUS; SUBCUTANEOUS AS NEEDED
Status: DISCONTINUED | OUTPATIENT
Start: 2020-12-08 | End: 2020-12-09 | Stop reason: HOSPADM

## 2020-12-08 RX ORDER — INSULIN HUMAN 100 [IU]/ML
INJECTION, SUSPENSION SUBCUTANEOUS
COMMUNITY
Start: 2020-12-04 | End: 2020-12-11

## 2020-12-08 RX ORDER — FENTANYL CITRATE 50 UG/ML
INJECTION, SOLUTION INTRAMUSCULAR; INTRAVENOUS
Status: SHIPPED | OUTPATIENT
Start: 2020-12-08 | End: 2020-12-08

## 2020-12-08 RX ORDER — TRISODIUM CITRATE DIHYDRATE AND CITRIC ACID MONOHYDRATE 500; 334 MG/5ML; MG/5ML
SOLUTION ORAL
Status: COMPLETED
Start: 2020-12-08 | End: 2020-12-08

## 2020-12-08 RX ORDER — DOCUSATE SODIUM 100 MG/1
100 CAPSULE, LIQUID FILLED ORAL 2 TIMES DAILY
Status: DISCONTINUED | OUTPATIENT
Start: 2020-12-08 | End: 2020-12-11 | Stop reason: HOSPADM

## 2020-12-08 RX ORDER — SODIUM CHLORIDE, SODIUM LACTATE, POTASSIUM CHLORIDE, CALCIUM CHLORIDE 600; 310; 30; 20 MG/100ML; MG/100ML; MG/100ML; MG/100ML
1000 INJECTION, SOLUTION INTRAVENOUS CONTINUOUS
Status: DISCONTINUED | OUTPATIENT
Start: 2020-12-08 | End: 2020-12-09

## 2020-12-08 RX ORDER — IBUPROFEN 800 MG/1
800 TABLET ORAL EVERY 8 HOURS
Status: DISCONTINUED | OUTPATIENT
Start: 2020-12-08 | End: 2020-12-11 | Stop reason: HOSPADM

## 2020-12-08 RX ORDER — ONDANSETRON 2 MG/ML
INJECTION INTRAMUSCULAR; INTRAVENOUS
Status: COMPLETED
Start: 2020-12-08 | End: 2020-12-08

## 2020-12-08 RX ADMIN — DEXAMETHASONE SODIUM PHOSPHATE 4 MG: 4 INJECTION, SOLUTION INTRAMUSCULAR; INTRAVENOUS at 20:49

## 2020-12-08 RX ADMIN — PHENYLEPHRINE HYDROCHLORIDE 100 MCG: 10 INJECTION INTRAVENOUS at 21:36

## 2020-12-08 RX ADMIN — BUPIVACAINE HYDROCHLORIDE 5 MG: 7.5 INJECTION, SOLUTION EPIDURAL; RETROBULBAR at 21:30

## 2020-12-08 RX ADMIN — FAMOTIDINE: 10 INJECTION, SOLUTION INTRAVENOUS at 20:49

## 2020-12-08 RX ADMIN — SODIUM CITRATE AND CITRIC ACID MONOHYDRATE 30 ML: 500; 334 SOLUTION ORAL at 20:49

## 2020-12-08 RX ADMIN — KETOROLAC TROMETHAMINE 30 MG: 30 INJECTION, SOLUTION INTRAMUSCULAR at 22:06

## 2020-12-08 RX ADMIN — FENTANYL CITRATE 20 MCG: 50 INJECTION, SOLUTION INTRAMUSCULAR; INTRAVENOUS at 21:30

## 2020-12-08 RX ADMIN — OXYTOCIN-SODIUM CHLORIDE 0.9% IV SOLN 30 UNIT/500ML 87.3 MILLI-UNITS/MIN: 30-0.9/5 SOLUTION at 23:15

## 2020-12-08 RX ADMIN — SODIUM CHLORIDE, POTASSIUM CHLORIDE, SODIUM LACTATE AND CALCIUM CHLORIDE 1000 ML: 600; 310; 30; 20 INJECTION, SOLUTION INTRAVENOUS at 17:35

## 2020-12-08 RX ADMIN — CEFAZOLIN 3 G: 10 INJECTION, POWDER, FOR SOLUTION INTRAVENOUS at 21:38

## 2020-12-08 RX ADMIN — OXYTOCIN 20 UNITS: 10 INJECTION, SOLUTION INTRAMUSCULAR; INTRAVENOUS at 21:43

## 2020-12-08 RX ADMIN — SODIUM CHLORIDE, POTASSIUM CHLORIDE, SODIUM LACTATE AND CALCIUM CHLORIDE: 600; 310; 30; 20 INJECTION, SOLUTION INTRAVENOUS at 21:12

## 2020-12-08 RX ADMIN — MORPHINE SULFATE 0.2 MG: 0.5 INJECTION, SOLUTION EPIDURAL; INTRATHECAL; INTRAVENOUS at 21:30

## 2020-12-08 RX ADMIN — ONDANSETRON 4 MG: 2 INJECTION INTRAMUSCULAR; INTRAVENOUS at 20:49

## 2020-12-08 RX ADMIN — SODIUM CHLORIDE, POTASSIUM CHLORIDE, SODIUM LACTATE AND CALCIUM CHLORIDE: 600; 310; 30; 20 INJECTION, SOLUTION INTRAVENOUS at 21:41

## 2020-12-08 NOTE — PROGRESS NOTES
EFW from [facility] 3385gm (7.46 lbs) on 2020, >99%. Most recent visit [facility] recommends delivery between 37 to 38 wks. Pt also with GHTN not on meds. BP mild range today. /mod/+acc/?variables post accels, maría 120s; [place]: irritability with come ctxs q 2-3 mins. +FM. Denies LOF/VB/CTXs. Losing mucus plug. SVE- internal os difficult to reach, external os 1cm. Discussed plan for IOL for 2020 with Dr Magnolia Galindo and cervidil on 12/10. I need to contact the provider first as I am not on call. Briefly reviewed risks of induction of labor to include but not be limited to risk of failed induction, emergent  section and its associated risks including injury to internal organs, bleeding, infection, hysterectomy, blood clot, reoperation, prolonged hospital stay; shoulder dystocia. Patient understands. GBS swab collected. Covid testing ordered. Send to l&D for fetal monitoring to check for variables.

## 2020-12-08 NOTE — H&P
History and Physical    Patient: Gwen Rocha MRN: [id number]  SSN: [id number]    YOB: 1995  Age: 31 y.o. Sex: female      Subjective:      Gwen Rocha is a 31 y.o. female at 37.2 weeks presents for extended monitoring from the office. GHTN -not any meds, Diabetic on Insulin with good control. [facility] rec: delivery between 37-38 weeks. Past Medical History:   Diagnosis Date    Anemia of pregnancy 7/31/2020    Diabetes mellitus     History of chlamydia     History of gonorrhea     IDDM (insulin dependent diabetes mellitus)     Nausea and vomiting 7/31/2020    Obesity     Polycystic ovary syndrome 7/31/2020    Pregnant 7/31/2020    RhD negative 7/31/2020    Trichomonas vaginalis infection 07/31/2020    Type 2 diabetes mellitus without complication (HCC)     Weight loss      No past surgical history on file. Family History   Problem Relation Age of Onset    Lung Disease Mother     Diabetes Mother     Diabetes Paternal Aunt     Diabetes Paternal Uncle     Heart Disease Maternal Grandmother     Hypertension Maternal Grandmother     Diabetes Father     Breast Cancer Maternal Aunt      Social History     Tobacco Use    Smoking status: Former Smoker    Smokeless tobacco: Never Used   Substance Use Topics    Alcohol use: No      Prior to Admission medications    Medication Sig Start Date End Date Taking? Authorizing Provider   HumuLIN N NPH Insulin KwikPen 100 unit/mL (3 mL) inpn INJECT 50 UNITS SUBCUTANEOUSLY EVERY MORNING AND 5O UNITS AT BEDTIME 12/4/20   Provider, Historical   BD Ultra-Fine Mini Pen Needle 31 gauge x 3/16\" ndle USE AS DIRECTED DAILY 11/19/20   Provider, Historical   insulin detemir U-100 (Levemir FlexTouch U-100 Insuln) 100 unit/mL (3 mL) inpn 45/45 U Q AM AND QHS  Indications: type 2 diabetes mellitus 11/21/20   Kirstin Diaz MD   insulin aspart U-100 (NovoLOG Flexpen U-100 Insulin) 100 unit/mL (3 mL) inpn Inject 6U/4U/6U FOR BR/LUNC/DINNER . Plus sliding scale to max 50 units daily. Indications: type 2 diabetes mellitus 20   Keily Basilio MD   acetaminophen (TYLENOL) 500 mg tablet  10/3/20   Provider, Historical   ferrous sulfate 325 mg (65 mg iron) tablet Take  by mouth Daily (before breakfast). Provider, Historical   PNV133-ferrous fumarate-FA (Prenatal)  mg-mcg tab Take  by mouth. Provider, Historical   docusate sodium (Stool Softener) 100 mg capsule Take 100 mg by mouth two (2) times a day. Provider, Historical   Insulin Needles, Disposable, (BD Ultra-Fine Short Pen Needle) 31 gauge x \" ndle 200 Packages by SubCUTAneous route four (4) times daily. 20   Ankur Alvarado MD   Lancets (ONE TOUCH DELICA) Misc 337 Packages by Does Not Apply route four (4) times daily. 11   Ankur Alvarado MD   glucose blood VI test strips (ONE TOUCH TEST) strip 200 Each by Does Not Apply route four (4) times daily. 11   Ankur Alvarado MD        Allergies   Allergen Reactions    Cat Hair Std Allergenic Ext Rash, Itching and Swelling       Review of Systems:  A comprehensive review of systems was negative except for that written in the History of Present Illness.     Objective:     Vitals:    20 1533   Resp: 16   Temp: 98.9 °F (37.2 °C)   SpO2: 100%        Physical Exam:  GENERAL: alert, cooperative, no distress, appears stated age  ABDOMEN: Gravid, NST reactive    Assessment:     Hospital Problems  Date Reviewed: 2020    None      37 weeks pregnant  Diabetes on Insulin  HTN- no meds  Obesity    Plan:     IOL with Cervidil-risks benefits and alternatives DWP in detail including possible     Signed By: Halie Caputo MD     2020

## 2020-12-08 NOTE — PROGRESS NOTES
Problem: Patient Education: Go to Patient Education Activity  Goal: Patient/Family Education  Outcome: Progressing Towards Goal     Problem: Vaginal Delivery: Day of Deliver-Laboring  Goal: Off Pathway (Use only if patient is Off Pathway)  Outcome: Progressing Towards Goal  Goal: Activity/Safety  Outcome: Progressing Towards Goal  Goal: Consults, if ordered  Outcome: Progressing Towards Goal  Goal: Diagnostic Test/Procedures  Outcome: Progressing Towards Goal  Goal: Nutrition/Diet  Outcome: Progressing Towards Goal  Goal: Discharge Planning  Outcome: Progressing Towards Goal  Goal: Medications  Outcome: Progressing Towards Goal  Goal: Respiratory  Outcome: Progressing Towards Goal  Goal: Treatments/Interventions/Procedures  Outcome: Progressing Towards Goal  Goal: *Vital signs within defined limits  Outcome: Progressing Towards Goal  Goal: *Labs within defined limits  Outcome: Progressing Towards Goal  Goal: *Hemodynamically stable  Outcome: Progressing Towards Goal  Goal: *Optimal pain control at patient's stated goal  Outcome: Progressing Towards Goal     Problem: Vaginal Delivery: Day of Delivery-Post delivery  Goal: Off Pathway (Use only if patient is Off Pathway)  Outcome: Progressing Towards Goal  Goal: Activity/Safety  Outcome: Progressing Towards Goal  Goal: Consults, if ordered  Outcome: Progressing Towards Goal  Goal: Nutrition/Diet  Outcome: Progressing Towards Goal  Goal: Discharge Planning  Outcome: Progressing Towards Goal  Goal: Medications  Outcome: Progressing Towards Goal  Goal: Treatments/Interventions/Procedures  Outcome: Progressing Towards Goal  Goal: *Vital signs within defined limits  Outcome: Progressing Towards Goal  Goal: *Labs within defined limits  Outcome: Progressing Towards Goal  Goal: *Hemodynamically stable  Outcome: Progressing Towards Goal  Goal: *Optimal pain control at patient's stated goal  Outcome: Progressing Towards Goal  Goal: *Participates in infant care  Outcome: Progressing Towards Goal  Goal: *Demonstrates progressive activity  Outcome: Progressing Towards Goal  Goal: *Tolerating diet  Outcome: Progressing Towards Goal     Problem: Vaginal Delivery: Postpartum Day 1  Goal: Off Pathway (Use only if patient is Off Pathway)  Outcome: Progressing Towards Goal  Goal: Activity/Safety  Outcome: Progressing Towards Goal  Goal: Consults, if ordered  Outcome: Progressing Towards Goal  Goal: Diagnostic Test/Procedures  Outcome: Progressing Towards Goal  Goal: Nutrition/Diet  Outcome: Progressing Towards Goal  Goal: Discharge Planning  Outcome: Progressing Towards Goal  Goal: Medications  Outcome: Progressing Towards Goal  Goal: Treatments/Interventions/Procedures  Outcome: Progressing Towards Goal  Goal: Psychosocial  Outcome: Progressing Towards Goal  Goal: *Vital signs within defined limits  Outcome: Progressing Towards Goal  Goal: *Labs within defined limits  Outcome: Progressing Towards Goal  Goal: *Hemodynamically stable  Outcome: Progressing Towards Goal  Goal: *Optimal pain control at patient's stated goal  Outcome: Progressing Towards Goal  Goal: *Participates in infant care  Outcome: Progressing Towards Goal  Goal: *Demonstrates progressive activity  Outcome: Progressing Towards Goal  Goal: *Performs self perineal care  Outcome: Progressing Towards Goal  Goal: *Appropriate parent-infant bonding  Outcome: Progressing Towards Goal  Goal: *Tolerating diet  Outcome: Progressing Towards Goal  Goal: *Performs self breast care  Outcome: Progressing Towards Goal     Problem: Vaginal Delivery: Postpartum 2  Goal: Off Pathway (Use only if patient is Off Pathway)  Outcome: Progressing Towards Goal  Goal: Activity/Safety  Outcome: Progressing Towards Goal  Goal: Nutrition/Diet  Outcome: Progressing Towards Goal  Goal: Discharge Planning  Outcome: Progressing Towards Goal  Goal: Medications  Outcome: Progressing Towards Goal  Goal: Treatments/Interventions/Procedures  Outcome: Progressing Towards Goal  Goal: Psychosocial  Outcome: Progressing Towards Goal     Problem: Vaginal Delivery: Discharge Outcomes  Goal: *Verbalizes name, dosage, time, side effects, and number of days to continue medications  Outcome: Progressing Towards Goal  Goal: *Describes available resources and support systems  Outcome: Progressing Towards Goal  Goal: *No signs and symptoms of infection  Outcome: Progressing Towards Goal  Goal: *Birth certificate information completed  Outcome: Progressing Towards Goal  Goal: *Received and verbalizes understanding of discharge plan and instructions  Outcome: Progressing Towards Goal  Goal: *Vital signs within defined limits  Outcome: Progressing Towards Goal  Goal: *Labs within defined limits  Outcome: Progressing Towards Goal  Goal: *Hemodynamically stable  Outcome: Progressing Towards Goal  Goal: *Optimal pain control at patient's stated goal  Outcome: Progressing Towards Goal  Goal: *Participates in infant care  Outcome: Progressing Towards Goal  Goal: *Demonstrates progressive activity  Outcome: Progressing Towards Goal  Goal: *Appropriate parent-infant bonding  Outcome: Progressing Towards Goal  Goal: *Tolerating diet  Outcome: Progressing Towards Goal

## 2020-12-09 LAB
BASOPHILS # BLD: 0 K/UL (ref 0–0.1)
BASOPHILS NFR BLD: 0 % (ref 0–1)
DIFFERENTIAL METHOD BLD: ABNORMAL
EOSINOPHIL # BLD: 0 K/UL (ref 0–0.4)
EOSINOPHIL NFR BLD: 0 % (ref 0–7)
ERYTHROCYTE [DISTWIDTH] IN BLOOD BY AUTOMATED COUNT: 16.1 % (ref 11.5–14.5)
GLUCOSE BLD STRIP.AUTO-MCNC: 131 MG/DL (ref 65–100)
GLUCOSE BLD STRIP.AUTO-MCNC: 182 MG/DL (ref 65–100)
GLUCOSE BLD STRIP.AUTO-MCNC: 197 MG/DL (ref 65–100)
GLUCOSE BLD STRIP.AUTO-MCNC: 199 MG/DL (ref 65–100)
GLUCOSE BLD STRIP.AUTO-MCNC: 201 MG/DL (ref 65–100)
GLUCOSE BLD STRIP.AUTO-MCNC: 230 MG/DL (ref 65–100)
HCT VFR BLD AUTO: 29.2 % (ref 35–47)
HGB BLD-MCNC: 9.2 G/DL (ref 11.5–16)
IMM GRANULOCYTES # BLD AUTO: 0.1 K/UL (ref 0–0.04)
IMM GRANULOCYTES NFR BLD AUTO: 0 % (ref 0–0.5)
LYMPHOCYTES # BLD: 1.9 K/UL (ref 0.8–3.5)
LYMPHOCYTES NFR BLD: 13 % (ref 12–49)
MCH RBC QN AUTO: 22.2 PG (ref 26–34)
MCHC RBC AUTO-ENTMCNC: 31.5 G/DL (ref 30–36.5)
MCV RBC AUTO: 70.5 FL (ref 80–99)
MONOCYTES # BLD: 1.2 K/UL (ref 0–1)
MONOCYTES NFR BLD: 8 % (ref 5–13)
MRSA DNA SPEC QL NAA+PROBE: NOT DETECTED
NEUTS SEG # BLD: 11.8 K/UL (ref 1.8–8)
NEUTS SEG NFR BLD: 79 % (ref 32–75)
PERFORMED BY, TECHID: ABNORMAL
PLATELET # BLD AUTO: 393 K/UL (ref 150–400)
PMV BLD AUTO: 11.3 FL (ref 8.9–12.9)
RBC # BLD AUTO: 4.14 M/UL (ref 3.8–5.2)
WBC # BLD AUTO: 14.9 K/UL (ref 3.6–11)

## 2020-12-09 PROCEDURE — 99284 EMERGENCY DEPT VISIT MOD MDM: CPT

## 2020-12-09 PROCEDURE — 74011250637 HC RX REV CODE- 250/637: Performed by: OBSTETRICS & GYNECOLOGY

## 2020-12-09 PROCEDURE — 75410000003 HC RECOV DEL/VAG/CSECN EA 0.5 HR

## 2020-12-09 PROCEDURE — 82962 GLUCOSE BLOOD TEST: CPT

## 2020-12-09 PROCEDURE — 85461 HEMOGLOBIN FETAL: CPT

## 2020-12-09 PROCEDURE — 74011636637 HC RX REV CODE- 636/637: Performed by: OBSTETRICS & GYNECOLOGY

## 2020-12-09 PROCEDURE — 85025 COMPLETE CBC W/AUTO DIFF WBC: CPT

## 2020-12-09 PROCEDURE — 74011250636 HC RX REV CODE- 250/636: Performed by: OBSTETRICS & GYNECOLOGY

## 2020-12-09 PROCEDURE — 86900 BLOOD TYPING SEROLOGIC ABO: CPT

## 2020-12-09 PROCEDURE — 65410000002 HC RM PRIVATE OB

## 2020-12-09 RX ORDER — LABETALOL 100 MG/1
100 TABLET, FILM COATED ORAL ONCE
Status: DISCONTINUED | OUTPATIENT
Start: 2020-12-09 | End: 2020-12-09

## 2020-12-09 RX ORDER — LABETALOL 200 MG/1
300 TABLET, FILM COATED ORAL 2 TIMES DAILY
Qty: 60 TAB | Refills: 1 | Status: SHIPPED | OUTPATIENT
Start: 2020-12-09 | End: 2021-01-22

## 2020-12-09 RX ORDER — CALCIUM CARBONATE 200(500)MG
400 TABLET,CHEWABLE ORAL
Status: DISCONTINUED | OUTPATIENT
Start: 2020-12-09 | End: 2020-12-11 | Stop reason: HOSPADM

## 2020-12-09 RX ORDER — OXYCODONE HYDROCHLORIDE 5 MG/1
5 TABLET ORAL
Qty: 20 TAB | Refills: 0 | Status: SHIPPED | OUTPATIENT
Start: 2020-12-09 | End: 2020-12-13

## 2020-12-09 RX ORDER — DEXTROSE 50 % IN WATER (D50W) INTRAVENOUS SYRINGE
25 AS NEEDED
Status: DISCONTINUED | OUTPATIENT
Start: 2020-12-09 | End: 2020-12-11 | Stop reason: HOSPADM

## 2020-12-09 RX ORDER — INSULIN LISPRO 100 [IU]/ML
INJECTION, SOLUTION INTRAVENOUS; SUBCUTANEOUS
Status: DISCONTINUED | OUTPATIENT
Start: 2020-12-09 | End: 2020-12-11 | Stop reason: HOSPADM

## 2020-12-09 RX ORDER — OXYTOCIN/RINGER'S LACTATE 30/500 ML
10 PLASTIC BAG, INJECTION (ML) INTRAVENOUS AS NEEDED
Status: DISCONTINUED | OUTPATIENT
Start: 2020-12-09 | End: 2020-12-11 | Stop reason: HOSPADM

## 2020-12-09 RX ORDER — LABETALOL 100 MG/1
100 TABLET, FILM COATED ORAL ONCE
Status: COMPLETED | OUTPATIENT
Start: 2020-12-09 | End: 2020-12-09

## 2020-12-09 RX ORDER — OXYTOCIN/0.9 % SODIUM CHLORIDE 30/500 ML
87.3 PLASTIC BAG, INJECTION (ML) INTRAVENOUS AS NEEDED
Status: DISCONTINUED | OUTPATIENT
Start: 2020-12-09 | End: 2020-12-11 | Stop reason: HOSPADM

## 2020-12-09 RX ORDER — MAGNESIUM SULFATE 100 %
4 CRYSTALS MISCELLANEOUS AS NEEDED
Status: DISCONTINUED | OUTPATIENT
Start: 2020-12-09 | End: 2020-12-11 | Stop reason: HOSPADM

## 2020-12-09 RX ADMIN — INSULIN LISPRO 4 UNITS: 100 INJECTION, SOLUTION INTRAVENOUS; SUBCUTANEOUS at 22:20

## 2020-12-09 RX ADMIN — HUMAN RHO(D) IMMUNE GLOBULIN 0.3 MG: 300 INJECTION, SOLUTION INTRAMUSCULAR at 18:10

## 2020-12-09 RX ADMIN — DOCUSATE SODIUM 100 MG: 100 CAPSULE, LIQUID FILLED ORAL at 22:20

## 2020-12-09 RX ADMIN — DOCUSATE SODIUM 100 MG: 100 CAPSULE, LIQUID FILLED ORAL at 09:03

## 2020-12-09 RX ADMIN — IBUPROFEN 800 MG: 800 TABLET ORAL at 13:46

## 2020-12-09 RX ADMIN — SODIUM CHLORIDE, POTASSIUM CHLORIDE, SODIUM LACTATE AND CALCIUM CHLORIDE 125 ML/HR: 600; 310; 30; 20 INJECTION, SOLUTION INTRAVENOUS at 03:35

## 2020-12-09 RX ADMIN — DIPHENHYDRAMINE HYDROCHLORIDE 25 MG: 25 CAPSULE ORAL at 04:43

## 2020-12-09 RX ADMIN — SIMETHICONE CHEW TAB 80 MG 80 MG: 80 TABLET ORAL at 22:20

## 2020-12-09 RX ADMIN — LABETALOL HYDROCHLORIDE 200 MG: 200 TABLET, FILM COATED ORAL at 22:20

## 2020-12-09 RX ADMIN — LABETALOL HYDROCHLORIDE 200 MG: 200 TABLET, FILM COATED ORAL at 09:03

## 2020-12-09 RX ADMIN — IBUPROFEN 800 MG: 800 TABLET ORAL at 22:20

## 2020-12-09 RX ADMIN — LABETALOL HYDROCHLORIDE 100 MG: 100 TABLET, FILM COATED ORAL at 03:14

## 2020-12-09 RX ADMIN — LABETALOL HYDROCHLORIDE 200 MG: 200 TABLET, FILM COATED ORAL at 00:13

## 2020-12-09 RX ADMIN — INSULIN LISPRO 2 UNITS: 100 INJECTION, SOLUTION INTRAVENOUS; SUBCUTANEOUS at 18:07

## 2020-12-09 RX ADMIN — OXYCODONE HYDROCHLORIDE 5 MG: 5 TABLET ORAL at 00:54

## 2020-12-09 NOTE — PROGRESS NOTES
In NICU visiting baby  1300 returned from NICU with father of baby.     1450 rounded to give report, patient in NICU to see baby

## 2020-12-09 NOTE — PROGRESS NOTES
Progress Note    Patient: Sanchez Lemos MRN: [id number]  SSN: [id number]    YOB: 1995  Age: 31 y.o. Sex: female      Admit Date: 12/8/2020    LOS: 0 days     Subjective:     No Complaints    Objective:     Vitals:    12/08/20 2035 12/08/20 2040 12/08/20 2045 12/08/20 2049   BP:   (!) 144/92    Pulse:   (!) 101    Resp:       Temp:       SpO2: 100% 99%  100%        Intake and Output:  Current Shift: No intake/output data recorded. Last three shifts: 12/07 0701 - 12/08 1900  In: 162.4 [I.V.:162.4]  Out: -     Physical Exam:   Abd:Gravid, occasional late decels after UC, then will have regular tracing with BTBV    Lab/Data Review: All lab results for the last 24 hours reviewed.          Assessment:     Active Problems:    Type 2 diabetes mellitus without complication ([facility] [address] ()      Pregnant (7/31/2020)      Gestational hypertension, third trimester (11/19/2020)      Obesity affecting pregnancy in third trimester (12/8/2020)        Plan:   Fetal monitoring strip DWP and SO-Options DWP, proceed with LTCS, risks benefits and alternatives DWP    Signed By: Mandeep Flores MD     December 8, 2020

## 2020-12-09 NOTE — PROGRESS NOTES
TRANSFER - IN REPORT:    Verbal report received from [facility] on [facility]  being received from L&D for routine post - op      Report consisted of patients Situation, Background, Assessment and   Recommendations(SBAR). Information from the following report(s) SBAR was reviewed with the receiving nurse. Opportunity for questions and clarification was provided. Assessment completed upon patients arrival to unit and care assumed. 0430 Transferred from L&D. Room orientation completed. Hourly rounding and bedside shift report discussed with patient. New medication side effect sheet reviewed. Understanding Mother & Baby Care booklet given. Post-birth warning signs sheet and local community resource sheet given. Instructed to call nurse first time ambulating. Venodynes to BLE.      0500 Pt itching, Benadryl PO given. Pt states she feels a little shaky to check her blood sugar. Blood sugar obtained 182. Pt previously received flu vaccine. 8077 Electric breast pump started.  Pt aware to use every 2-3 hours for breast stimulation    0700 report given Mihir Lorenzo RN

## 2020-12-09 NOTE — OP NOTES
[facility]  OPERATIVE REPORT    Name:  Governor Veliz  MR#:  [id number]  :  1995  ACCOUNT #:  [de-identified]  DATE OF SERVICE:  2020    PREOPERATIVE DIAGNOSES:  1.  37 weeks' pregnant. 2.  Diabetes affecting pregnancy. 3.  Obesity affecting pregnancy. 4.  Nonreassuring fetal tracing. POSTOPERATIVE DIAGNOSES:  1.  37 weeks' pregnant. 2.  Diabetes affecting pregnancy. 3.  Obesity affecting pregnancy. 4.  Nonreassuring fetal tracing. PROCEDURE PERFORMED:  Primary low transverse . SURGEON:  Bakari Steele MD    ASSISTANT:  Chrissy Crowell    ANESTHESIA:  Spinal.    COMPLICATIONS:  None. SPECIMENS REMOVED:  Cord blood. IMPLANTS:  None. ESTIMATED BLOOD LOSS:  500 mL. DRAINS:  Castano catheter, clear urine. SURGICAL FINDINGS:  Normal uterus, tubes, and ovaries. Live born infant, delivered normal vertex fashion, three-vessel cord, normal-appearing placenta. Cord blood drawn, antibiotics given. Peds and Respiratory present at the time of delivery. PROCEDURE:  The patient was taken to the operating room after informed consent had been reviewed. She was placed on the operating room table and administered spinal anesthesia and placed in the supine position, left lateral tilt. She was prepped and draped in normal sterile fashion. A time-out was performed by myself. After this was done, a Pfannenstiel skin incision was made with a scalpel, taken down to the underlying layer of fascia. The fascia was nicked in the midline. The fascial incision was then extended laterally using a curved Narayan scissors. The superior margin of the fascia was grasped with Kocher clamps x2, and the rectus muscles were dissected off both sharply and bluntly. Attention was turned to the inferior margin, where it was done in similar fashion. Rectus muscles were  in the midline. Peritoneum was identified, tented up, and entered sharply.   It was extended superiorly and inferiorly under direct vision. Bladder blade was placed, bladder flap was created, and the lower uterine segment of the uterus was then scored in a horizontal orientation using the scalpel. It was nicked in the midline. Clear fluid was noted. Using blunt dissection, the uterine incision was extended laterally. The infant's head was brought into the surgical incision site while guiding the infant's head applying fundal pressure. The head was delivered. Then, the rest of the body was delivered. The infant was suctioned. Cord was clamped x2 and cut. The infant was handed off to the awaiting staff. Cord blood was drawn. Placenta was delivered. The uterus was cleared of any remaining blood clot and tissue. Bladder blade was replaced. Uterine incision was grasped with Allis clamps. The uterine incision was reapproximated using 0 Vicryl in a running locked fashion. Good hemostasis was noted. Several 0 Vicryl imbricating stitches were placed to ensure hemostasis. Bladder flap was reapproximated using a 2-0 Vicryl in running fashion. All areas were inspected and noted to be hemostatic. Any remaining blood clot, laps, and instruments were removed from the pelvis. Anterior peritoneum was grasped with Jayla clamps and reapproximated using a 2-0 Vicryl in running fashion. Fascia was closed using a 0 Vicryl in running fashion. Subcuticular fat was inspected. Hemostasis was achieved with a Bovie. It was reapproximated using a 2-0 plain gut in interrupted fashion. Skin was closed using a 4-0 Monocryl in subcuticular fashion. Dressings were placed. Sponge, lap, and needle counts were correct x2. The patient tolerated the procedure without complications and was taken to the recovery room in stable condition.       MD REAL Frias/[email or web address]  D:  12/08/2020 22:30  T:  12/09/2020 8:32  JOB #:  [id number]

## 2020-12-09 NOTE — OP NOTES
Operative Report    Patient: Penny Cohen MRN: [id number]  SSN: [id number]    YOB: 1995  Age: 31 y.o. Sex: female       Date of Surgery: 2020     Preoperative Diagnosis: Fetal Intolerance of Labor , Diabetes, obesity,37 weeks pregnant    Postoperative Diagnosis: same    Surgeon(s) and Role:     Michelet Perez MD - Primary    Anesthesia: Spinal    Procedure: Procedure(s):   SECTION     Estimated Blood Loss: 799YP    Complications: None    Drains:   Castano-clear    Specimens:Cord blood      Surgical Findings: Normal Uterus tubes and ovaries, LBI delivered in normal VTX fashion, 3V Cord, Nl Placenta, Abxs Given, Cord Blood drawn, Peds and Resp present at delivery    Counts: Sponge and needle counts were correct times two.     Signed By:  Jonna Baumgarten, MD     2020

## 2020-12-09 NOTE — ANESTHESIA PREPROCEDURE EVALUATION
Relevant Problems   No relevant active problems       Anesthetic History     PONV          Review of Systems / Medical History  Patient summary reviewed, nursing notes reviewed and pertinent labs reviewed    Pulmonary  Within defined limits                 Neuro/Psych   Within defined limits           Cardiovascular    Hypertension                   GI/Hepatic/Renal                Endo/Other    Diabetes    Morbid obesity and anemia    Comments: Polycystic Ovary Syndrome Other Findings   Comments: Infection:   COVID-19 (Rule Out)  Allergies  Cat Hair Std Allergenic Ext  Ht: 5' 3\" (160 cm)  Weight: 134.7 kg (297 lb)  BMI: 52.61 kg/m²  CrCl:   164 mL/min  Procedure   SECTION  Add On, [facility] L&D OR  Anes: Malcolm Fang MD  No responsible anesthesiologist  Provider:   Akosua Spicer MD      Medical History  Weight loss  Diabetes mellitus  IDDM (insulin dependent diabetes mellitus)  Anemia of pregnancy  Nausea and vomiting  Polycystic ovary syndrome  Pregnant  RhD negative  Obesity  Trichomonas vaginalis infection  History of chlamydia  History of gonorrhea  Type 2 diabetes mellitus without complication ([address]  Essential hypertension  Gestational hypertension  Gestational diabetes  Polycystic disease, ovaries    Anesthesia Related   Type 2 diabetes mellitus without complication ([address]  Gestational hypertension, third trimester           Physical Exam    Airway  Mallampati: III  TM Distance: 4 - 6 cm  Neck ROM: normal range of motion   Mouth opening: Normal     Cardiovascular    Rhythm: regular  Rate: normal         Dental  No notable dental hx       Pulmonary  Breath sounds clear to auscultation               Abdominal  GI exam deferred       Other Findings   Comments: Results for Aishwarya Luu (MRN [id number]) as of 2020 20:49    2020 17:50  WBC: 10.8  NRBC: 0.0  RBC: 4.31  HGB: 9.8 (L)  HCT: 30.4 (L)  MCV: 70.5 (L)  MCH: 22.7 (L)  MCHC: 32.2  RDW: 16.4 (H)  PLATELET: 613 (H)  MPV: 11.3  NEUTROPHILS: 62  LYMPHOCYTES: 27  MONOCYTES: 10  EOSINOPHILS: 1  BASOPHILS: 0  IMMATURE GRANULOCYTES: 0  DF: AUTOMATED  ABSOLUTE NRBC: 0.00  ABS. NEUTROPHILS: 6.7  ABS. IMM. GRANS.: 0.0  ABS. LYMPHOCYTES: 2.9  ABS. MONOCYTES: 1.1 (H)  ABS. EOSINOPHILS: 0.1  ABS. BASOPHILS: 0.0         Anesthetic Plan    ASA: 3, emergent  Anesthesia type: spinal      Post-op pain plan if not by surgeon: intrathecal opiates      Anesthetic plan and risks discussed with: Patient and Family      Risks and benefits of epidural analgesia discussed with patient/family in the patient holding room. All questions answered.

## 2020-12-09 NOTE — PROGRESS NOTES
Progress Note    Patient: Lisa Werner MRN: [id number]  SSN: [id number]    YOB: 1995  Age: 31 y.o. Sex: female      Admit Date: 12/8/2020    LOS: 1 day     Subjective:     No Complaints    Objective:     Vitals:    12/09/20 0500 12/09/20 0602 12/09/20 0705 12/09/20 0740   BP: (!) 143/89 125/85 (!) 143/67 (!) 140/76   Pulse: 89 90 (!) 101 96   Resp: 16 16 18 18   Temp: 98.2 °F (36.8 °C) 98.6 °F (37 °C) 98.7 °F (37.1 °C) 98.5 °F (36.9 °C)   SpO2: 100% 100% 100% 99%        Intake and Output:  Current Shift: No intake/output data recorded. Last three shifts: 12/07 1901 - 12/09 0700  In: 1862.4 [I.V.:1862.4]  Out: 1600 [Urine:800]    Physical Exam:   Abd:FF  INC: CDI    Lab/Data Review: All lab results for the last 24 hours reviewed. Recent Results (from the past 12 hour(s))   GLUCOSE, POC    Collection Time: 12/08/20 10:47 PM   Result Value Ref Range    Glucose (POC) 173 (H) 65 - 100 mg/dL    Performed by Zion Goodrich, POC    Collection Time: 12/09/20  4:56 AM   Result Value Ref Range    Glucose (POC) 182 (H) 65 - 100 mg/dL    Performed by [facility]    CBC WITH AUTOMATED DIFF    Collection Time: 12/09/20  6:30 AM   Result Value Ref Range    WBC 14.9 (H) 3.6 - 11.0 K/uL    RBC 4.14 3.80 - 5.20 M/uL    HGB 9.2 (L) 11.5 - 16.0 g/dL    HCT 29.2 (L) 35.0 - 47.0 %    MCV 70.5 (L) 80.0 - 99.0 FL    MCH 22.2 (L) 26.0 - 34.0 PG    MCHC 31.5 30.0 - 36.5 g/dL    RDW 16.1 (H) 11.5 - 14.5 %    PLATELET 064 822 - 387 K/uL    MPV 11.3 8.9 - 12.9 FL    NEUTROPHILS 79 (H) 32 - 75 %    LYMPHOCYTES 13 12 - 49 %    MONOCYTES 8 5 - 13 %    EOSINOPHILS 0 0 - 7 %    BASOPHILS 0 0 - 1 %    IMMATURE GRANULOCYTES 0 0.0 - 0.5 %    ABS. NEUTROPHILS 11.8 (H) 1.8 - 8.0 K/UL    ABS. LYMPHOCYTES 1.9 0.8 - 3.5 K/UL    ABS. MONOCYTES 1.2 (H) 0.0 - 1.0 K/UL    ABS. EOSINOPHILS 0.0 0.0 - 0.4 K/UL    ABS. BASOPHILS 0.0 0.0 - 0.1 K/UL    ABS. IMM.  GRANS. 0.1 (H) 0.00 - 0.04 K/UL    DF AUTOMATED Assessment:     Active Problems:    Type 2 diabetes mellitus without complication ([facility] [address] ()      Pregnant (2020)      Gestational hypertension, third trimester (2020)      Obesity affecting pregnancy in third trimester (2020)      Born by  section (2020)        Plan:     Routine POD/PP orders  BS QID, SSI  Cont.  Labetalol    Signed By: Yamilex Collado MD     2020

## 2020-12-09 NOTE — ANESTHESIA PROCEDURE NOTES
Spinal Block    Start time: 12/8/2020 9:12 PM  End time: 12/8/2020 9:26 PM  Performed by: Jason Pickett CRNA  Authorized by: Martin Hutchinson MD     Pre-procedure:   Indications: at surgeon's request, post-op pain management, procedure for pain and primary anesthetic  Preanesthetic Checklist: patient identified, risks and benefits discussed, anesthesia consent, site marked, patient being monitored and timeout performed    Timeout Time: 21:29          Spinal Block:   Patient Position:  Seated        Location:  L3-4  Technique:  Catheter    Local Dose (mL):  1.5    Needle:   Needle Type:  Pencil-tip  Needle Gauge:  22 G  Attempts:  2      Events: CSF confirmed, no blood with aspiration and no paresthesia        Assessment:  Insertion:  Uncomplicated  Patient tolerance:  Patient tolerated the procedure well with no immediate complications

## 2020-12-09 NOTE — PROGRESS NOTES
Shift report received from [address] [place] [address]. Assume care of pt. Pt uncomplaining at this time. 2000: Covid swab and MRSA swab of boil to left groin done and taken to lab. 2015: Strip reviewed with Dr. Larry Sotomayor pt having late decelerations. 2021: Rn at bedside,  Dr. Larry Sotomayor at bedside talking with pt and her s\o, strip reviewed with them and plan of care for c/section discussed, understanding verbalized.      2043: Blood glucose (accu check) 129

## 2020-12-09 NOTE — ANESTHESIA POSTPROCEDURE EVALUATION
Procedure(s):   SECTION. spinal    Anesthesia Post Evaluation      Multimodal analgesia: multimodal analgesia used between 6 hours prior to anesthesia start to PACU discharge  Patient location during evaluation: bedside  Patient participation: complete - patient participated  Level of consciousness: awake and alert  Pain score: 0  Pain management: adequate  Airway patency: patent  Anesthetic complications: no  Cardiovascular status: acceptable  Respiratory status: acceptable  Hydration status: acceptable  Post anesthesia nausea and vomiting:  none  Final Post Anesthesia Temperature Assessment:  Normothermia (36.0-37.5 degrees C)      INITIAL Post-op Vital signs: No vitals data found for the desired time range.

## 2020-12-09 NOTE — PROGRESS NOTES
1600 Spoke to Dr cMfarland Foyosvany about pt SDEU=175 and orders read for insulin after meals and Accu checks are ordered fasting and 2 hrs post prandial.  Dr Mcfarland Fought orders to leave orders as they are and to give coverage at dinner time for FSBS 199.  1900 Report given to Huong Lopes RN. Patient off unit at this time to NICU.

## 2020-12-09 NOTE — PROGRESS NOTES
2000 Report received from MALOU Bingham RN to assume care of patient at this time. 2138 Uterine incision and Ancef 2gms    2206 Torodol    2335 Dr. Darryn Corbin informed of patient's blood sugar and recent blood pressure; orders received for BS monitoring: fasting, and 2hrs post prandial; treat with sliding scale for BS greater than 200; orders received to call for bp greater than 160/100, and start patient on Labetalol 200mg PO BID.     0225 Dr. Darryn Corbin made aware of patient's blood pressures. Orders received to transfer patient out with current blood pressures, and to discontinue scheduled Torodol. 4086 Report given to DARIN Saldana RN to assume care of patient at this time.

## 2020-12-09 NOTE — PROCEDURES
Post Epidural Block    Start time: 12/9/2020 3:44 PM  End time: 12/9/2020 3:59 PM  Performed by: Gurinder Watts CRNA  Authorized by: Gurinder Watts CRNA     Pre-Procedure  Indication: labor epidural      Assessment:   Patient tolerance:  Patient tolerated the procedure well with no immediate complications  No anesthesia complications suspected. Patient with no complaints of headache, nausea, or pain. Patient able to urinate and ambulate without difficulty.     BP (!) 126/52 (BP 1 Location: Right arm, BP Patient Position: At rest)   Pulse 100   Temp 36.8 °C (98.3 °F)   Resp 18   SpO2 98%   Breastfeeding Unknown

## 2020-12-09 NOTE — PROGRESS NOTES
1500: Received this 26y/o SBF ambulatory to L/D #2 and accompanied by her boyfriend. She is a  with an EDC: 2020. IUP @ 37.2 weeks. Patient was sent from Dr. Edgard Franco office for a Nonreassuring Fetal Heart pattern while being monitored in the office. Urine specimen obtained for an U/A to be performed. Gown then to bed. Patient oriented to the room and POC reviewed. 1516: Patient positioned on left side. Vital signs obtained. 1538: Patient's OB strip noted to have Variable decels and late decels. Position changed to right side. 1545: Dr. Demarcus Norris in the patient's room and reviewing the OB strip. The writer received a verbal order from Dr. Demarcus Norris to admit the patient. Dr. Demarcus Norris informed the patient that she could eat. Routine admission procedures initiated. 1635: Temp. 99.1.    1658: One Touch: 83. Asymptomatic.    1705: The writer notified Dr. Demarcus Norris of the following: Patient's GBS performed today in the office (No results), Aware of the boils present along the patient's left groin and under right armpit, One touch reading \"83' and Notified of the patient's insulins that she takes at home. The writer received the following verbal orders from Dr. Demarcus Norris. Hold all the patient's  Insulin, Perform one touch every 4 hours and Initiate the GBS prophylactic at 0200.    1735: 1st bag of 1000ml L/R iinitiated at 125ml/hr via pump,no infiltration noted. 1750: Patient sitting up in the bed and eating food brought in by the patient's boyfriend. 1900: Report given to the oncoming shift. 0: Bedside report given to MALOU Bingham RN.

## 2020-12-10 LAB
ABO + RH BLD: NORMAL
BASOPHILS # BLD: 0 K/UL (ref 0–0.1)
BASOPHILS NFR BLD: 0 % (ref 0–1)
BLD PROD TYP BPU: NORMAL
BLD PROD TYP BPU: NORMAL
BPU ID: NORMAL
DIFFERENTIAL METHOD BLD: ABNORMAL
EOSINOPHIL # BLD: 0.2 K/UL (ref 0–0.4)
EOSINOPHIL NFR BLD: 2 % (ref 0–7)
ERYTHROCYTE [DISTWIDTH] IN BLOOD BY AUTOMATED COUNT: 16.3 % (ref 11.5–14.5)
FETAL SCREEN,FMHS: NEGATIVE
GLUCOSE BLD STRIP.AUTO-MCNC: 174 MG/DL (ref 65–100)
GLUCOSE BLD STRIP.AUTO-MCNC: 181 MG/DL (ref 65–100)
GLUCOSE BLD STRIP.AUTO-MCNC: 237 MG/DL (ref 65–100)
GLUCOSE BLD STRIP.AUTO-MCNC: 256 MG/DL (ref 65–100)
HCT VFR BLD AUTO: 24.6 % (ref 35–47)
HGB BLD-MCNC: 7.6 G/DL (ref 11.5–16)
IMM GRANULOCYTES # BLD AUTO: 0 K/UL (ref 0–0.04)
IMM GRANULOCYTES NFR BLD AUTO: 0 % (ref 0–0.5)
LYMPHOCYTES # BLD: 3.6 K/UL (ref 0.8–3.5)
LYMPHOCYTES NFR BLD: 31 % (ref 12–49)
MCH RBC QN AUTO: 22 PG (ref 26–34)
MCHC RBC AUTO-ENTMCNC: 30.9 G/DL (ref 30–36.5)
MCV RBC AUTO: 71.3 FL (ref 80–99)
MONOCYTES # BLD: 1.2 K/UL (ref 0–1)
MONOCYTES NFR BLD: 10 % (ref 5–13)
NEUTS SEG # BLD: 6.8 K/UL (ref 1.8–8)
NEUTS SEG NFR BLD: 57 % (ref 32–75)
NUM BPU REQUESTED: 1
PERFORMED BY, TECHID: ABNORMAL
PLATELET # BLD AUTO: 316 K/UL (ref 150–400)
PMV BLD AUTO: 10.9 FL (ref 8.9–12.9)
RBC # BLD AUTO: 3.45 M/UL (ref 3.8–5.2)
STATUS OF UNIT,%ST: NORMAL
TRANSFUSION STATUS PATIENT QL: NORMAL
UNIT DIVISION, %UDIV: 0
WBC # BLD AUTO: 11.8 K/UL (ref 3.6–11)

## 2020-12-10 PROCEDURE — 82962 GLUCOSE BLOOD TEST: CPT

## 2020-12-10 PROCEDURE — 74011250637 HC RX REV CODE- 250/637: Performed by: OBSTETRICS & GYNECOLOGY

## 2020-12-10 PROCEDURE — 36415 COLL VENOUS BLD VENIPUNCTURE: CPT

## 2020-12-10 PROCEDURE — 85025 COMPLETE CBC W/AUTO DIFF WBC: CPT

## 2020-12-10 PROCEDURE — 74011636637 HC RX REV CODE- 636/637: Performed by: OBSTETRICS & GYNECOLOGY

## 2020-12-10 PROCEDURE — 65410000002 HC RM PRIVATE OB

## 2020-12-10 RX ORDER — GLIPIZIDE 5 MG/1
5 TABLET ORAL
Status: DISCONTINUED | OUTPATIENT
Start: 2020-12-11 | End: 2020-12-11 | Stop reason: HOSPADM

## 2020-12-10 RX ADMIN — INSULIN LISPRO 2 UNITS: 100 INJECTION, SOLUTION INTRAVENOUS; SUBCUTANEOUS at 15:10

## 2020-12-10 RX ADMIN — DOCUSATE SODIUM 100 MG: 100 CAPSULE, LIQUID FILLED ORAL at 22:12

## 2020-12-10 RX ADMIN — IBUPROFEN 800 MG: 800 TABLET ORAL at 05:52

## 2020-12-10 RX ADMIN — ACETAMINOPHEN 650 MG: 325 TABLET, FILM COATED ORAL at 03:10

## 2020-12-10 RX ADMIN — INSULIN LISPRO 4 UNITS: 100 INJECTION, SOLUTION INTRAVENOUS; SUBCUTANEOUS at 21:03

## 2020-12-10 RX ADMIN — IBUPROFEN 800 MG: 800 TABLET ORAL at 13:50

## 2020-12-10 RX ADMIN — SIMETHICONE CHEW TAB 80 MG 80 MG: 80 TABLET ORAL at 05:52

## 2020-12-10 RX ADMIN — LABETALOL HYDROCHLORIDE 200 MG: 200 TABLET, FILM COATED ORAL at 08:36

## 2020-12-10 RX ADMIN — CALCIUM CARBONATE (ANTACID) CHEW TAB 500 MG 400 MG: 500 CHEW TAB at 02:52

## 2020-12-10 RX ADMIN — IBUPROFEN 800 MG: 800 TABLET ORAL at 22:12

## 2020-12-10 RX ADMIN — OXYCODONE HYDROCHLORIDE 5 MG: 5 TABLET ORAL at 08:36

## 2020-12-10 RX ADMIN — INSULIN LISPRO 6 UNITS: 100 INJECTION, SOLUTION INTRAVENOUS; SUBCUTANEOUS at 10:27

## 2020-12-10 RX ADMIN — LABETALOL HYDROCHLORIDE 200 MG: 200 TABLET, FILM COATED ORAL at 22:12

## 2020-12-10 RX ADMIN — DOCUSATE SODIUM 100 MG: 100 CAPSULE, LIQUID FILLED ORAL at 08:36

## 2020-12-10 NOTE — PROGRESS NOTES
2120 message sent to [id number] [facility] via [employer] to have him call the unit    2200 informed of 2 hour pp blood sugar of 230, instructed to cover using sliding scale for after meals and to give 4 units; informed of 126/52 and 126/71 last bps and verified labetalol order; requested order for tums

## 2020-12-10 NOTE — PROGRESS NOTES
Progress Note    Patient: Arta Bumpers MRN: [id number]  SSN: [id number]    YOB: 1995  Age: 31 y.o. Sex: female      Admit Date: 12/8/2020    LOS: 2 days     Subjective:     No Complaints    Objective:     Vitals:    12/09/20 1955 12/10/20 0005 12/10/20 0304 12/10/20 0836   BP: 126/71 134/75 135/73 138/69   Pulse:  (!) 111 100 97   Resp: 18 18 20 18   Temp: 98.2 °F (36.8 °C) 97.4 °F (36.3 °C) 98.3 °F (36.8 °C) 98.1 °F (36.7 °C)   SpO2: 99% 97% 100% 97%        Intake and Output:  Current Shift: No intake/output data recorded. Last three shifts: 12/08 1901 - 12/10 0700  In: 1700 [I.V.:1700]  Out: 2800 [Urine:2000]    Physical Exam:   Abd:  FF  INC: CDI    Lab/Data Review: All lab results for the last 24 hours reviewed.      Recent Results (from the past 24 hour(s))   GLUCOSE, POC    Collection Time: 12/09/20 10:59 AM   Result Value Ref Range    Glucose (POC) 197 (H) 65 - 100 mg/dL    Performed by Traci Sanderson, POC    Collection Time: 12/09/20  3:43 PM   Result Value Ref Range    Glucose (POC) 199 (H) 65 - 100 mg/dL    Performed by [address] [place] [address] , POC    Collection Time: 12/09/20  5:55 PM   Result Value Ref Range    Glucose (POC) 131 (H) 65 - 100 mg/dL    Performed by [facility] Benjy Jones, POC    Collection Time: 12/09/20  8:53 PM   Result Value Ref Range    Glucose (POC) 230 (H) 65 - 100 mg/dL    Performed by Anjelica Joseph    GLUCOSE, POC    Collection Time: 12/09/20 10:13 PM   Result Value Ref Range    Glucose (POC) 201 (H) 65 - 100 mg/dL    Performed by Oscar FULLER    CBC WITH AUTOMATED DIFF    Collection Time: 12/10/20  4:00 AM   Result Value Ref Range    WBC 11.8 (H) 3.6 - 11.0 K/uL    RBC 3.45 (L) 3.80 - 5.20 M/uL    HGB 7.6 (L) 11.5 - 16.0 g/dL    HCT 24.6 (L) 35.0 - 47.0 %    MCV 71.3 (L) 80.0 - 99.0 FL    MCH 22.0 (L) 26.0 - 34.0 PG    MCHC 30.9 30.0 - 36.5 g/dL    RDW 16.3 (H) 11.5 - 14.5 %    PLATELET 714 962 - 269 K/uL    MPV 10.9 8.9 - 12.9 FL NEUTROPHILS 57 32 - 75 %    LYMPHOCYTES 31 12 - 49 %    MONOCYTES 10 5 - 13 %    EOSINOPHILS 2 0 - 7 %    BASOPHILS 0 0 - 1 %    IMMATURE GRANULOCYTES 0 0.0 - 0.5 %    ABS. NEUTROPHILS 6.8 1.8 - 8.0 K/UL    ABS. LYMPHOCYTES 3.6 (H) 0.8 - 3.5 K/UL    ABS. MONOCYTES 1.2 (H) 0.0 - 1.0 K/UL    ABS. EOSINOPHILS 0.2 0.0 - 0.4 K/UL    ABS. BASOPHILS 0.0 0.0 - 0.1 K/UL    ABS. IMM.  GRANS. 0.0 0.00 - 0.04 K/UL    DF AUTOMATED     GLUCOSE, POC    Collection Time: 12/10/20  5:51 AM   Result Value Ref Range    Glucose (POC) 256 (H) 65 - 100 mg/dL    Performed by [place] Band        Assessment:     Active Problems:    Type 2 diabetes mellitus without complication ([facility] [address] ()      Pregnant (2020)      Gestational hypertension, third trimester (2020)      Obesity affecting pregnancy in third trimester (2020)      Born by  section (2020)        Plan:     Routine POD/PP # 2 orders    Signed By: Evelyn Corral MD     December 10, 2020

## 2020-12-10 NOTE — DISCHARGE INSTRUCTIONS
Patient Education        Learning About Starting to Breastfeed  Planning ahead     Before your baby is born, plan ahead. Learn all you can about breastfeeding. This helps make breastfeeding easier. · Early in your pregnancy, talk to your doctor or midwife about breastfeeding. · Learn the basics before your baby is born. The staff at hospitals and birthing centers can help you find a lactation specialist. This person is often a nurse who has been trained to teach and advise women about breastfeeding. Or you can take a breastfeeding class. · Plan ahead for times when you will need help after your baby is born. Many women get help from friends and family. Some join a support group to talk to other moms who breastfeed. · Buy the equipment you'll need. Examples are breast pads, nipple cream, extra pillows, and nursing bras. Find out about breast pumps too. Getting help from your hospital or birthing center  It's important to have support from the doctors, nurses, and hospital staff who care for you and your baby. Before it's time for you to give birth, ask about the breastfeeding policies at your hospital or birthing center. Look for a hospital or birthing center that has policies for:  · \"Rooming in. \" This policy encourages you to have your baby in the room with you. It can allow you to breastfeed more often. · Supplemental feedings. Tell the staff that your baby is to get only your breast milk from birth. If staff feed your baby water, sugar solution, or formula right after birth without a medical reason, it may make it harder for you to breastfeed. · Pacifiers or artificial nipples. Staff should not give your  these items. They may interfere with breastfeeding. · Follow-up. Find out if your hospital can help you with breastfeeding issues after you go home. See if you can get information on support groups or other contacts.  They might help if you need help setting up and staying with your breastfeeding routine. Your first feeding  It's best to start breastfeeding within 1 hour of birth. For each feeding, you go through these basic steps:  · Get ready for the feeding. Be calm and relaxed, and try not to be distracted. Get some water or juice for yourself. Use two or three pillows to help support your baby while he or she is nursing. · Find a breastfeeding position that is comfortable for you and your baby. Examples are the cradle and the football positions. Make sure the baby's head and chest are lined up straight and facing your breast. It's best to switch which breast you start with each time. · Get the baby latched on well. Your baby's mouth needs to be wide open, like a yawn, so you may need to gently touch the middle of your baby's lower lip. When your baby's mouth is open wide, quickly bring the baby onto your nipple and areola. The areola is the dark [place] around your nipple. · Provide a complete feeding. Let your baby decide how long to nurse. Be sure to burp your baby after each breast.  In the first days after birth, your breasts make a thick, yellow liquid called colostrum. This liquid gives your baby nutrients and antibodies against infection. It is all that babies need at first. Your breasts will fill with milk a few days after the birth. Talk to your doctor, midwife, or lactation specialist right away if you are having problems and aren't sure what to do. How often to breastfeed  Plan to breastfeed your baby on demand rather than setting a strict schedule. For the first 2 weeks, be prepared to breastfeed at least 8 times in a 24-hour period. In the first few days, you may need to wake a sleeping baby to feed. If you breastfeed more often, it will help your breasts to produce more milk. After you go home  After you're home, don't be afraid to call your doctor, midwife, or lactation specialist with questions. That's true even if you don't know what's bothering you.  They are used to parents of newborns calling. They can help you figure out if there is a problem, and if so, how to fix it. Plan for times when you will be apart from your baby. Use a breast pump to collect breast milk ahead of time. You can store milk in the refrigerator or freezer. Then it's ready when someone else will be taking care of your baby. Experts recommend waiting about a month until breastfeeding is going well before offering a bottle. Breastfeeding is a learned skill that gets easier over time. You are more likely to succeed if you plan ahead, learn the basic techniques, and know where to get help and support. Where can you learn more? Go to http://www.gray.com/  Enter Q917 in the search box to learn more about \"Learning About Starting to Breastfeed. \"  Current as of: February 11, 2020               Content Version: 12.6  © 0239-9590 [employer]. Care instructions adapted under license by [employer] (which disclaims liability or warranty for this information). If you have questions about a medical condition or this instruction, always ask your healthcare professional. [address] any warranty or liability for your use of this information.

## 2020-12-11 VITALS
OXYGEN SATURATION: 99 % | RESPIRATION RATE: 17 BRPM | TEMPERATURE: 98.1 F | SYSTOLIC BLOOD PRESSURE: 123 MMHG | DIASTOLIC BLOOD PRESSURE: 68 MMHG | HEART RATE: 90 BPM

## 2020-12-11 LAB
GLUCOSE BLD STRIP.AUTO-MCNC: 130 MG/DL (ref 65–100)
GLUCOSE BLD STRIP.AUTO-MCNC: 165 MG/DL (ref 65–100)
PERFORMED BY, TECHID: ABNORMAL
PERFORMED BY, TECHID: ABNORMAL

## 2020-12-11 PROCEDURE — 74011250637 HC RX REV CODE- 250/637: Performed by: OBSTETRICS & GYNECOLOGY

## 2020-12-11 PROCEDURE — 74011636637 HC RX REV CODE- 636/637: Performed by: OBSTETRICS & GYNECOLOGY

## 2020-12-11 PROCEDURE — 82962 GLUCOSE BLOOD TEST: CPT

## 2020-12-11 RX ADMIN — OXYCODONE HYDROCHLORIDE 5 MG: 5 TABLET ORAL at 00:12

## 2020-12-11 RX ADMIN — LABETALOL HYDROCHLORIDE 200 MG: 200 TABLET, FILM COATED ORAL at 08:19

## 2020-12-11 RX ADMIN — DOCUSATE SODIUM 100 MG: 100 CAPSULE, LIQUID FILLED ORAL at 08:19

## 2020-12-11 RX ADMIN — IBUPROFEN 800 MG: 800 TABLET ORAL at 05:54

## 2020-12-11 RX ADMIN — INSULIN LISPRO 2 UNITS: 100 INJECTION, SOLUTION INTRAVENOUS; SUBCUTANEOUS at 10:56

## 2020-12-11 RX ADMIN — GLIPIZIDE 5 MG: 5 TABLET ORAL at 08:44

## 2020-12-11 NOTE — PROGRESS NOTES
Post-Operative  Day 2    Amaya Face     Information for the patient's :  Jose Warren [[id number]]   , Low Transverse    Patient doing well without significant complaint. Tolerating diet, passing flatus, voiding and ambulating without difficulty    Vitals:    Visit Vitals  /66 (BP 1 Location: Right arm)   Pulse (!) 105   Temp 98.3 °F (36.8 °C)   Resp 18   SpO2 99%   Breastfeeding Unknown     Temp (24hrs), Av.3 °F (36.8 °C), Min:98.3 °F (36.8 °C), Max:98.3 °F (36.8 °C)        Exam:        Patient without distress. Abdomen, bowel sounds present, soft, expected tenderness, fundus firm                Wound incision clean, dry and intact               Lower extremities are negative for swelling, cords or tenderness.     Labs:   Lab Results   Component Value Date/Time    WBC 11.8 (H) 12/10/2020 04:00 AM    WBC 14.9 (H) 2020 06:30 AM    WBC 10.8 2020 05:50 PM    WBC 10.3 2020 11:17 AM    WBC 9.6 10/08/2020 04:00 PM    HGB 7.6 (L) 12/10/2020 04:00 AM    HGB 9.2 (L) 2020 06:30 AM    HGB 9.8 (L) 2020 05:50 PM    HGB 10.1 (L) 2020 11:17 AM    HGB 9.8 (L) 10/08/2020 04:00 PM    HCT 24.6 (L) 12/10/2020 04:00 AM    HCT 29.2 (L) 2020 06:30 AM    HCT 30.4 (L) 2020 05:50 PM    HCT 32.0 (L) 2020 11:17 AM    HCT 30.9 (L) 10/08/2020 04:00 PM    PLATELET 609  04:00 AM    PLATELET 080  06:30 AM    PLATELET 835 (H)  05:50 PM    PLATELET 756  11:17 AM    PLATELET 737  04:00 PM    Hgb, External 9.6 06/15/2020    Hct, External 29.7% 06/15/2020    Platelet cnt., External 468 06/15/2020       Recent Results (from the past 24 hour(s))   GLUCOSE, POC    Collection Time: 12/10/20 10:21 AM   Result Value Ref Range    Glucose (POC) 237 (H) 65 - 100 mg/dL    Performed by Magnolia Parker    GLUCOSE, POC    Collection Time: 12/10/20  3:05 PM   Result Value Ref Range Glucose (POC) 174 (H) 65 - 100 mg/dL    Performed by Demetria Puga    GLUCOSE, POC    Collection Time: 12/10/20  8:57 PM   Result Value Ref Range    Glucose (POC) 181 (H) 65 - 100 mg/dL    Performed by Kennedy Regalado    GLUCOSE, POC    Collection Time: 12/11/20  5:56 AM   Result Value Ref Range    Glucose (POC) 130 (H) 65 - 100 mg/dL    Performed by Kennedy Regalado        Assessment: Post-Op day 2, doing well      Plan:   1. Discharge home today  2. Follow up in office in 6 weeks with Karly Allison MD  3. Post partum activity advised, diet as tolerated  4.  Discharge Medications: ibuprofen, percocet and medications prior to admission

## 2020-12-11 NOTE — DISCHARGE SUMMARY
Name: Anshu Nurse MRN: [id number]  SSN: [id number]    YOB: 1995  Age: 31 y.o. Sex: female      Admit Date: 2020    Discharge Date: 2020     Admitting Physician: Karly Allison MD     Attending Physician:  Lord Sabrina MD     Admission Diagnoses: Pregnant [Z34.90]    Discharge Diagnoses:   Information for the patient's :  Master Wolff [[id number]]   Delivery of a 4.407 kg male infant via , Low Transverse on 2020 at 9:44 PM  by Karly Allison. Apgars were 8  and 9 . Additional Diagnoses:   Hospital Problems  Date Reviewed: 2020          Codes Class Noted POA    Obesity affecting pregnancy in third trimester ICD-10-CM: O99.213  ICD-9-CM: 649.13  2020 Unknown        Born by  section ICD-10-CM: Z38.01  ICD-9-CM: V30.01  2020 Unknown        Gestational hypertension, third trimester ICD-10-CM: O13.3  ICD-9-CM: 642.33  2020 Yes        Pregnant ICD-10-CM: Z34.90  ICD-9-CM: V22.2  2020 Unknown        [facility] PIB-90-NV: E11.9  ICD-9-CM: 250.00  Unknown Yes             Lab Results   Component Value Date/Time    ABO/Rh(D) A Negative 2020 06:30 AM    Rubella, External immune 06/15/2020       Immunization(s):   Immunization History   Administered Date(s) Administered    Rho(D) Immune Globulin - IM 2020        Hospital Course: Normal hospital course following the delivery. Patient Instructions:   Current Discharge Medication List      START taking these medications    Details   oxyCODONE IR (ROXICODONE) 5 mg immediate release tablet Take 1 Tab by mouth every four (4) hours as needed for Pain for up to 4 days. Max Daily Amount: 30 mg.  Qty: 20 Tab, Refills: 0    Associated Diagnoses: Born by  section      labetaloL (NORMODYNE) 200 mg tablet Take 1.5 Tabs by mouth two (2) times a day.   Qty: 60 Tab, Refills: 1         CONTINUE these medications which have NOT CHANGED Details   ferrous sulfate 325 mg (65 mg iron) tablet Take  by mouth Daily (before breakfast). PNV133-ferrous fumarate-FA (Prenatal)  mg-mcg tab Take  by mouth. acetaminophen (TYLENOL) 500 mg tablet       docusate sodium (Stool Softener) 100 mg capsule Take 100 mg by mouth two (2) times a day. Insulin Needles, Disposable, (BD Ultra-Fine Short Pen Needle) 31 gauge x 5/16\" ndle 200 Packages by SubCUTAneous route four (4) times daily. Qty: 200 Package, Refills: 6    Associated Diagnoses: Gestational diabetes mellitus (GDM) in first trimester, gestational diabetes method of control unspecified      Lancets (ONE TOUCH DELICA) Misc 770 Packages by Does Not Apply route four (4) times daily. Qty: 200 Package, Refills: 6    Associated Diagnoses: Type II or unspecified type diabetes mellitus without mention of complication, uncontrolled      glucose blood VI test strips (ONE TOUCH TEST) strip 200 Each by Does Not Apply route four (4) times daily. Qty: 200 Package, Refills: 6    Associated Diagnoses: Type II or unspecified type diabetes mellitus without mention of complication, uncontrolled         STOP taking these medications       HumuLIN N NPH Insulin KwikPen 100 unit/mL (3 mL) inpn Comments:   Reason for Stopping:         BD Ultra-Fine Mini Pen Needle 31 gauge x 3/16\" ndle Comments:   Reason for Stopping:         insulin detemir U-100 (Levemir FlexTouch U-100 Insuln) 100 unit/mL (3 mL) inpn Comments:   Reason for Stopping:         insulin aspart U-100 (NovoLOG Flexpen U-100 Insulin) 100 unit/mL (3 mL) inpn Comments:   Reason for Stopping:               Reference my discharge instructions.     Follow-up Appointments   Procedures    FOLLOW UP VISIT Appointment in: 6 Weeks     Standing Status:   Standing     Number of Occurrences:   1     Order Specific Question:   Appointment in     Answer:   6 Weeks        Signed By:  Kelsi Connor MD     December 11, 2020      \"[id number]\"     I spent 30 minutes or less with the patient to perform a final examination, discuss the hospital stay, give instructions for continuing care to all relevant caregivers and prepare discharge records, prescriptions and referral forms.

## 2020-12-11 NOTE — PROGRESS NOTES
Discharge plan of care/case management plan validated with provider discharge order. 1105:  Discharge instructions printed out and provided to patient. Patient verbalized instructions explained to her with all questions answered. Patient aware of follow-up OB appointment. Patient knows when to call MD or 911. Patient to remain hospitality at this time as baby is in NICU.

## 2020-12-12 LAB — B-HEM STREP SPEC QL CULT: NEGATIVE

## 2020-12-14 ENCOUNTER — TELEPHONE (OUTPATIENT)
Dept: OBGYN CLINIC | Age: 25
End: 2020-12-14

## 2020-12-14 NOTE — TELEPHONE ENCOUNTER
I called the patient and she stated that she was having pain in incision area. She stated that she is also having heavy vaginal bleeding with large clots. She is also c/o a large hemmrrhoid in rectal area.   Appointment made with Dr Sruthi Gonzalez for 11:30 today

## 2020-12-29 ENCOUNTER — OFFICE VISIT (OUTPATIENT)
Dept: OBGYN CLINIC | Age: 25
End: 2020-12-29
Payer: MEDICAID

## 2020-12-29 VITALS
WEIGHT: 263 LBS | SYSTOLIC BLOOD PRESSURE: 122 MMHG | DIASTOLIC BLOOD PRESSURE: 76 MMHG | BODY MASS INDEX: 46.6 KG/M2 | HEIGHT: 63 IN

## 2020-12-29 DIAGNOSIS — O13.3 GESTATIONAL HYPERTENSION, THIRD TRIMESTER: Primary | ICD-10-CM

## 2020-12-29 DIAGNOSIS — Z98.891 STATUS POST PRIMARY LOW TRANSVERSE CESAREAN SECTION: ICD-10-CM

## 2020-12-29 DIAGNOSIS — Z09 POSTOP CHECK: ICD-10-CM

## 2020-12-29 DIAGNOSIS — N89.8 VAGINAL DISCHARGE: ICD-10-CM

## 2020-12-29 DIAGNOSIS — E11.9 TYPE 2 DIABETES MELLITUS WITHOUT COMPLICATION, UNSPECIFIED WHETHER LONG TERM INSULIN USE (HCC): ICD-10-CM

## 2020-12-29 PROBLEM — Z34.90 PREGNANT: Status: RESOLVED | Noted: 2020-07-31 | Resolved: 2020-12-29

## 2020-12-29 PROBLEM — R11.2 NAUSEA AND VOMITING: Status: RESOLVED | Noted: 2020-07-31 | Resolved: 2020-12-29

## 2020-12-29 PROBLEM — O09.93 HIGH-RISK PREGNANCY IN THIRD TRIMESTER: Status: RESOLVED | Noted: 2020-08-06 | Resolved: 2020-12-29

## 2020-12-29 PROBLEM — O99.019 ANEMIA OF PREGNANCY: Status: RESOLVED | Noted: 2020-07-31 | Resolved: 2020-12-29

## 2020-12-29 PROBLEM — O36.63X0 EXCESSIVE FETAL GROWTH AFFECTING MANAGEMENT OF PREGNANCY IN THIRD TRIMESTER: Status: RESOLVED | Noted: 2020-10-25 | Resolved: 2020-12-29

## 2020-12-29 PROBLEM — O99.213 OBESITY AFFECTING PREGNANCY IN THIRD TRIMESTER: Status: RESOLVED | Noted: 2020-12-08 | Resolved: 2020-12-29

## 2020-12-29 PROCEDURE — 0503F POSTPARTUM CARE VISIT: CPT | Performed by: OBSTETRICS & GYNECOLOGY

## 2020-12-29 RX ORDER — ACETAMINOPHEN 500 MG
TABLET ORAL
Qty: 1 KIT | Refills: 0 | Status: SHIPPED | OUTPATIENT
Start: 2020-12-29 | End: 2021-01-22

## 2020-12-29 RX ORDER — GLIPIZIDE 5 MG/1
5 TABLET ORAL DAILY
Qty: 30 TAB | Refills: 2 | Status: SHIPPED | OUTPATIENT
Start: 2020-12-29

## 2020-12-29 RX ORDER — FLUCONAZOLE 150 MG/1
150 TABLET ORAL DAILY
Qty: 2 TAB | Refills: 1 | Status: SHIPPED | OUTPATIENT
Start: 2020-12-29 | End: 2020-12-30

## 2020-12-29 NOTE — PROGRESS NOTES
Subjective:       Eleanor Gonzales is a 31 y.o. female  who presents for postop care following PCS with Dr Larry Sotomayor on 2020 for: Diabetes affecting pregnancy; Obesity affecting pregnancy; Nonreassuring fetal tracing. Appetite is good. Eating a regular diet without difficulty. Bowel movements are regular. The patient is voiding without difficulty. The patient is not having any pain. Lochia has ceased- was heavier in the beginning but resolved. Hemorrhoids have resolved. Bottle feeding and pumping. Per gestational hypertension, she is on labetalol 300 mg p.o. twice daily. She is not checking her blood pressures at home. Prior DM2, states her insulin had been discontinued while inpatient and that she was started on glipizide but this prescription was never sent to her pharmacy. Her fasting FSGs are 110s. Her 2 hours postprandials are 140s. She states endocrine told her not to check her fingersticks until 4 weeks postpartum but she is used to checking them. ROS: Denies fevers, chills, nausea vomiting, abdominal pain, constipation, diarrhea, dysuria. Positive yeastlike vaginal discharge. Objective:     Visit Vitals  /76 (BP 1 Location: Left arm, BP Patient Position: Sitting)   Ht 5' 3\" (1.6 m)   Wt 263 lb (119.3 kg)   LMP 2020   BMI 46.59 kg/m²          General:  alert, cooperative, no distress, appears stated age, obese   Abdomen: soft, bowel sounds active, non-tender   Incision:   c/d/i; ~3mm skin separation near right lateral edge, superficial, no abnml discharge; fundus firm below umbilicus     Assessment:       ICD-10-CM ICD-9-CM    1. Gestational hypertension, third trimester  O13.3 642.33 Blood Pressure Test Kit-Large kit   2. Postop check  Z09 V67.00    3. Status post primary low transverse  section  Z98.891 V45.89    4.  Type 2 diabetes mellitus without complication, unspecified whether long term insulin use (HCC)  E11.9 250.00 glipiZIDE (GLUCOTROL) 5 mg tablet   5. Vaginal discharge  N89.8 623.5 fluconazole (DIFLUCAN) 150 mg tablet         Plan:     Orders Placed This Encounter    fluconazole (DIFLUCAN) 150 mg tablet     Sig: Take 1 Tab by mouth daily for 1 day. Repeat dose in 72 hrs if sxs continue. Dispense:  2 Tab     Refill:  1    glipiZIDE (GLUCOTROL) 5 mg tablet     Sig: Take 1 Tab by mouth daily. Dispense:  30 Tab     Refill:  2    Blood Pressure Test Kit-Large kit     Sig: Check Bps twice a day and keep a log. Dispense:  1 Kit     Refill:  0     Check blood pressures at least twice daily. Continue Labetalol for now. Follow-up with endocrine. Glipizide prescribed in the interim. Fluconazole Rx for yeast vaginitis. Return to clinic for 6-weeks postpartum visit.

## 2021-01-12 ENCOUNTER — NURSE TRIAGE (OUTPATIENT)
Dept: OBGYN CLINIC | Age: 26
End: 2021-01-12

## 2021-01-12 NOTE — TELEPHONE ENCOUNTER
Home care delivery system contacted the office left voicemail trying to find out status of patient supply request would like a callback.

## 2021-01-14 NOTE — TELEPHONE ENCOUNTER
I called [facility] and they stated that they have received all needed documentation. Order is being processed.

## 2021-01-22 ENCOUNTER — OFFICE VISIT (OUTPATIENT)
Dept: OBGYN CLINIC | Age: 26
End: 2021-01-22
Payer: MEDICAID

## 2021-01-22 VITALS
WEIGHT: 261 LBS | DIASTOLIC BLOOD PRESSURE: 76 MMHG | BODY MASS INDEX: 46.25 KG/M2 | HEIGHT: 63 IN | SYSTOLIC BLOOD PRESSURE: 130 MMHG

## 2021-01-22 DIAGNOSIS — E28.2 POLYCYSTIC OVARY SYNDROME: ICD-10-CM

## 2021-01-22 DIAGNOSIS — E11.9 TYPE 2 DIABETES MELLITUS WITHOUT COMPLICATION, UNSPECIFIED WHETHER LONG TERM INSULIN USE (HCC): ICD-10-CM

## 2021-01-22 DIAGNOSIS — Z98.891 STATUS POST PRIMARY LOW TRANSVERSE CESAREAN SECTION: Primary | ICD-10-CM

## 2021-01-22 DIAGNOSIS — L73.2 HIDRADENITIS SUPPURATIVA: ICD-10-CM

## 2021-01-22 PROBLEM — O13.3 GESTATIONAL HYPERTENSION, THIRD TRIMESTER: Status: RESOLVED | Noted: 2020-11-19 | Resolved: 2021-01-22

## 2021-01-22 PROCEDURE — 0503F POSTPARTUM CARE VISIT: CPT | Performed by: OBSTETRICS & GYNECOLOGY

## 2021-01-22 RX ORDER — CLINDAMYCIN PHOSPHATE 10 UG/ML
LOTION TOPICAL 2 TIMES DAILY
Qty: 1 BOTTLE | Refills: 3 | Status: SHIPPED | OUTPATIENT
Start: 2021-01-22 | End: 2021-06-08 | Stop reason: ALTCHOICE

## 2021-01-22 RX ORDER — ETONOGESTREL AND ETHINYL ESTRADIOL 11.7; 2.7 MG/1; MG/1
INSERT, EXTENDED RELEASE VAGINAL
Qty: 3 DEVICE | Refills: 3 | Status: SHIPPED | OUTPATIENT
Start: 2021-01-22

## 2021-01-22 NOTE — PROGRESS NOTES
Subjective:   Ms. Ann Marie Saunders is a 31 y.o. [place] Frankel s/p PCS with Dr Kiera Ward on 2020 for: Diabetes affecting pregnancy; Obesity affecting pregnancy; Nonreassuring fetal tracing. OB History        1    Para   1    Term   1            AB        Living   1       SAB        TAB        Ectopic        Molar        Multiple   0    Live Births   1              Per DM2, she is on Glipizide 5mg po daily. Her fastings are 80s to low 100s. She has an appt with Endocrine in February. She is breast/bottle feeding. Lochia is light- light pink discharge. Mood is good. Has not resumed sexual intercourse. Patient Active Problem List    Diagnosis Date Noted    Hidradenitis suppurativa 2021    Status post primary low transverse  section 2020    Polycystic ovary syndrome 2020    RhD negative 2020    Type 2 diabetes mellitus without complication (HCC)         Review of Systems   Constitutional: Negative for chills and fever. Respiratory: Negative for shortness of breath. Cardiovascular: Negative for chest pain. Gastrointestinal: Negative for abdominal pain, constipation, diarrhea, nausea and vomiting. Genitourinary: Negative for dysuria. Objective:     Vitals:    21 1332   BP: 130/76   Weight: 261 lb (118.4 kg)   Height: 5' 3\" (1.6 m)        Gen: NAD  Resp: Respiratory effort normal  Breasts: Soft, symmetric, no abnormal nipple discharge, healed areas of hidradenitis suppurutiva in b/l axilla with related scarring; b/l breasts with healed areas of boils  Abdomen: Soft, nontender; inc/d/i  : Speculum: Normal vaginal wall, normal-appearing cervix, scant light pink lochia   Bimanual: Approximately 6-week size uterus, no cervical motion tenderness, no palpable adnexal masses, no adnexal tenderness; vulva- with open sores from hidradenitis supp. - none with discharge  Psych: Mood is appropriate      Assessment/Plan:       ICD-10-CM ICD-9-CM    1. Status post primary low transverse  section  Z98.891 V45.89    2. Postpartum exam  Z39.2 V24.2    3. Hidradenitis suppurativa  L73.2 705.83 REFERRAL TO DERMATOLOGY      clindamycin (CLEOCIN T) 1 % lotion   4. Polycystic ovary syndrome  E28.2 256.4 ethinyl estradiol-etonogestrel (NUVARING) 0.12-0.015 mg/24 hr vaginal ring       Normal postpartum visit. Pap nml in May 2020. She can return to regular activities. [place] depression screen score: 2. Reviewed proper usage for NuvaRing, use backup x1 week upon initiation. Return to clinic in May for annual GYN visit.

## 2021-01-22 NOTE — PATIENT INSTRUCTIONS
Hidradenitis Suppurativa: Care Instructions Your Care Instructions Hidradenitis suppurativa (say \"inn-oyct-bv-NY-tus sup-valerie-uh-TY-vuh\") is a skin condition that causes lumps on the skin that look like pimples or boils. The lumps are usually painful and can break open and drain blood and bad-smelling pus. The condition can come and go for many years. Treatment for this condition may include antibiotics and other medicines. You may need surgery to remove the lumps. Home care includes wearing loose-fitting clothes and washing the area gently. You can help prevent lumps from coming back by staying at a healthy weight and not smoking. Doctors don't know exactly how this condition starts. But they do know that something irritates and inflames the hair follicles, causing them to swell and form lumps. This skin condition can't be spread from person to person (isn't contagious). Follow-up care is a key part of your treatment and safety. Be sure to make and go to all appointments, and call your doctor if you are having problems. It's also a good idea to know your test results and keep a list of the medicines you take. How can you care for yourself at home? Skin care 
  · Wash the area every day with mild soap. Use your hands rather than a washcloth or sponge when you wash that part of your body.  
  · Leave the affected areas uncovered when you can. If you have lumps that are draining, you can cover them with a bandage or other dressing. Put petroleum jelly (such as Vaseline) on the dressing to help keep it from sticking.  
  · Wear-loose fitting clothes that don't rub against the area. Avoid activities that cause skin to rub together.  
  · If you have pain, try a warm compress. Soak a towel or washcloth in warm water, wring it out, and place it on the affected skin for about 10 minutes. Medicines   · Be safe with medicines. Take your medicines exactly as prescribed. Call your doctor if you think you are having a problem with your medicine. You will get more details on the specific medicines your doctor prescribes.  
  · If your doctor prescribed antibiotics, take them as directed. Do not stop taking them just because you feel better. You need to take the full course of antibiotics. Lifestyle choices 
  · If you smoke, think about quitting. Smoking can make the condition worse. If you need help quitting, talk to your doctor about stop-smoking programs and medicines. These can increase your chances of quitting for good.  
  · Stay at a healthy weight, or lose weight, by eating healthy foods and being physically active. Being overweight could make this condition worse. When should you call for help? Call your doctor now or seek immediate medical care if: 
  · You have symptoms of infection, such as: 
? Increased pain, swelling, warmth, or redness. ? Red streaks leading from the area. ? Pus draining from the area. ? A fever. Watch closely for changes in your health, and be sure to contact your doctor if: 
  · You do not get better as expected. Where can you learn more? Go to http://www.gray.com/ Enter H412 in the search box to learn more about \"Hidradenitis Suppurativa: Care Instructions. \" Current as of: July 2, 2020               Content Version: 12.6 © 1589-4572 [employer]. Care instructions adapted under license by [employer] (which disclaims liability or warranty for this information). If you have questions about a medical condition or this instruction, always ask your healthcare professional. [place] [address] any warranty or liability for your use of this information.

## 2021-02-04 ENCOUNTER — TELEPHONE (OUTPATIENT)
Dept: OBGYN CLINIC | Age: 26
End: 2021-02-04

## 2021-02-04 NOTE — TELEPHONE ENCOUNTER
The Dermatology office called to the office stating that they do not accept the patient's insurance. I called the patient and asked her to contact the insurance company and ask what Dermatologist are covered and to let us know so we can get referral to that provider.

## 2021-04-14 ENCOUNTER — OFFICE VISIT (OUTPATIENT)
Dept: OBGYN CLINIC | Age: 26
End: 2021-04-14
Payer: MEDICAID

## 2021-04-14 VITALS
DIASTOLIC BLOOD PRESSURE: 70 MMHG | OXYGEN SATURATION: 98 % | TEMPERATURE: 95.9 F | HEIGHT: 63 IN | HEART RATE: 110 BPM | SYSTOLIC BLOOD PRESSURE: 110 MMHG | RESPIRATION RATE: 18 BRPM | WEIGHT: 252 LBS | BODY MASS INDEX: 44.65 KG/M2

## 2021-04-14 DIAGNOSIS — B37.31 CANDIDAL VULVOVAGINITIS: Primary | ICD-10-CM

## 2021-04-14 DIAGNOSIS — Z11.3 SCREENING FOR STDS (SEXUALLY TRANSMITTED DISEASES): ICD-10-CM

## 2021-04-14 PROCEDURE — 99213 OFFICE O/P EST LOW 20 MIN: CPT | Performed by: OBSTETRICS & GYNECOLOGY

## 2021-04-14 RX ORDER — FLUCONAZOLE 150 MG/1
150 TABLET ORAL DAILY
Qty: 1 TAB | Refills: 1 | Status: SHIPPED | OUTPATIENT
Start: 2021-04-14 | End: 2021-04-15

## 2021-04-14 RX ORDER — CLOTRIMAZOLE AND BETAMETHASONE DIPROPIONATE 10; .64 MG/G; MG/G
CREAM TOPICAL 2 TIMES DAILY
Qty: 15 G | Refills: 1 | Status: SHIPPED | OUTPATIENT
Start: 2021-04-14

## 2021-04-14 NOTE — PROGRESS NOTES
Russell Perales is a 31 y.o. female who presents today for the following:  Chief Complaint   Patient presents with    Check Up     wants STD check    Vaginal Discharge        Allergies   Allergen Reactions    Cat Hair Std Allergenic Ext Rash, Itching and Swelling       Current Outpatient Medications   Medication Sig    fluconazole (DIFLUCAN) 150 mg tablet Take 1 Tab by mouth daily for 1 day. FDA advises cautious prescribing of oral fluconazole in pregnancy. Indications: a yeast infection of the vagina and vulva    clotrimazole-betamethasone (LOTRISONE) topical cream Apply  to affected area two (2) times a day.  ethinyl estradiol-etonogestrel (NUVARING) 0.12-0.015 mg/24 hr vaginal ring Insert 1 ring intravaginally each month. On the fourth week of the month, remove the ring to allow for menses. Insert a new ring the following week.  clindamycin (CLEOCIN T) 1 % lotion Apply  to affected area two (2) times a day. use thin film on affected area    glipiZIDE (GLUCOTROL) 5 mg tablet Take 1 Tab by mouth daily. No current facility-administered medications for this visit. Past Medical History:   Diagnosis Date    Anemia of pregnancy 7/31/2020    Diabetes mellitus     Essential hypertension     Gestational diabetes     Gestational hypertension     Gestational hypertension, third trimester 11/19/2020    History of chlamydia     History of gonorrhea     IDDM (insulin dependent diabetes mellitus)     Nausea and vomiting 7/31/2020    Obesity     Polycystic disease, ovaries     Polycystic ovary syndrome 7/31/2020    Pregnant 7/31/2020    RhD negative 7/31/2020    Trichomonas vaginalis infection 07/31/2020    Type 2 diabetes mellitus without complication ([address]     Weight loss        History reviewed. No pertinent surgical history.     Family History   Problem Relation Age of Onset    Lung Disease Mother     Diabetes Mother     Diabetes Paternal Aunt     Diabetes Paternal [place] Heart Disease Maternal Grandmother     Hypertension Maternal Grandmother     Diabetes Father     Breast Cancer Maternal Aunt        Social History     Socioeconomic History    Marital status: SINGLE     Spouse name: Not on file    Number of children: Not on file    Years of education: Not on file    Highest education level: Not on file   Occupational History     Comment: Unemployed   Social Needs    Financial resource strain: Not hard at all   Trey-Brenda insecurity     Worry: Never true     Inability: Never true    Transportation needs     Medical: No     Non-medical: No   Tobacco Use    Smoking status: Former Smoker    Smokeless tobacco: Never Used   Substance and Sexual Activity    Alcohol use: No    Drug use: No    Sexual activity: Yes   Lifestyle    Physical activity     Days per week: Not on file     Minutes per session: Not on file    Stress: Not on file   Relationships    Social connections     Talks on phone: Not on file     Gets together: Not on file     Attends Congregation service: Not on file     Active member of club or organization: Not on file     Attends meetings of clubs or organizations: Not on file     Relationship status: Not on file    Intimate partner violence     Fear of current or ex partner: Not on file     Emotionally abused: Not on file     Physically abused: Not on file     Forced sexual activity: Not on file   Other Topics Concern   [address] Service Not Asked    Blood Transfusions Not Asked    Caffeine Concern Not Asked    Occupational Exposure Not Asked    Hobby Hazards Not Asked    Sleep Concern Not Asked    Stress Concern Not Asked    Weight Concern Not Asked    Special Diet Not Asked    Back Care Not Asked    Exercise Not Asked    Bike Helmet Not Asked    Seat Belt Not Asked    Self-Exams Not Asked   Social History Narrative    Not on file         HPI  31years old patient with history of diabetes who presented today with complaints of vaginal discharge and vulvar itching. She is also requesting STD testing. ROS   Review of Systems   Constitutional: Negative. HENT: Negative. Eyes: Negative. Respiratory: Negative. Cardiovascular: Negative. Gastrointestinal: Negative. Genitourinary: Vaginal discharge. Musculoskeletal: Negative. Skin: Vulvar itching. Neurological: Negative. Endo/Heme/Allergies: Negative. Psychiatric/Behavioral: Negative. /70 (BP 1 Location: Left upper arm, BP Patient Position: Sitting, BP Cuff Size: Adult)   Pulse (!) 110   Temp (!) 95.9 °F (35.5 °C) (Temporal)   Resp 18   Ht 5' 3\" (1.6 m)   Wt 252 lb (114.3 kg)   LMP 03/27/2021 (Exact Date)   SpO2 98%   BMI 44.64 kg/m²    OBGyn Exam   Constitutional  · Appearance: well-nourished, well developed, alert, in no acute distress    HENT  · Head and Face: appears normal    Neck  · Inspection/Palpation: normal appearance, no masses or tenderness  · Lymph Nodes: no lymphadenopathy present  · Thyroid: gland size normal, nontender, no nodules or masses present on palpation    Breasts   Symmetric, no palpable masses, no tenderness, no skin changes, no nipple abnormality, no nipple discharge, no lymphadenopathy.     Chest  · Respiratory Effort: breathing labored  · Auscultation: normal breath sounds    Cardiovascular  · Heart:  · Auscultation: regular rate and rhythm without murmur    Gastrointestinal  · Abdominal Examination: abdomen non-tender to palpation, normal bowel sounds, no masses present  · Liver and spleen: no hepatomegaly present, spleen not palpable  · Hernias: no hernias identified    Genitourinary  · External Genitalia: normal appearance for age, no discharge present, no tenderness present, inflammatory lesions present, no masses present, no atrophy present  · Vagina: normal vaginal vault without central or paravaginal defects, whitish discharge present, no inflammatory lesions present, no masses present  · Bladder: non-tender to palpation  · Urethra: appears normal  · Cervix: normal   · Uterus: normal size, shape and consistency  · Adnexa: no adnexal tenderness present, no adnexal masses present  · Perineum: perineum within normal limits, no evidence of trauma, no rashes or skin lesions present  · Anus: anus within normal limits, no hemorrhoids present  · Inguinal Lymph Nodes: no lymphadenopathy present    Skin  · General Inspection: no rash, no lesions identified    Neurologic/Psychiatric  · Mental Status:  · Orientation: grossly oriented to person, place and time  · Mood and Affect: mood normal, affect appropriate    No results found for this visit on 04/14/21. Orders Placed This Encounter    CT/NG/T.VAGINALIS AMPLIFICATION     Order Specific Question:   Specimen source     Answer:   Vaginal [516]    fluconazole (DIFLUCAN) 150 mg tablet     Sig: Take 1 Tab by mouth daily for 1 day. FDA advises cautious prescribing of oral fluconazole in pregnancy. Indications: a yeast infection of the vagina and vulva     Dispense:  1 Tab     Refill:  1    clotrimazole-betamethasone (LOTRISONE) topical cream     Sig: Apply  to affected area two (2) times a day. Dispense:  15 g     Refill:  1         1. Candidal vulvovaginitis    - fluconazole (DIFLUCAN) 150 mg tablet; Take 1 Tab by mouth daily for 1 day. FDA advises cautious prescribing of oral fluconazole in pregnancy. Indications: a yeast infection of the vagina and vulva  Dispense: 1 Tab; Refill: 1  - clotrimazole-betamethasone (LOTRISONE) topical cream; Apply  to affected area two (2) times a day. Dispense: 15 g; Refill: 1    2. Screening for STDs (sexually transmitted diseases)    - CT/NG/T.VAGINALIS AMPLIFICATION        Follow-up and Dispositions    · Return if symptoms worsen or fail to improve.

## 2021-04-16 LAB
C TRACH RRNA SPEC QL NAA+PROBE: NEGATIVE
N GONORRHOEA RRNA SPEC QL NAA+PROBE: NEGATIVE
T VAGINALIS DNA SPEC QL NAA+PROBE: POSITIVE

## 2021-04-19 DIAGNOSIS — A59.9 TRICHIMONIASIS: Primary | ICD-10-CM

## 2021-04-19 RX ORDER — METRONIDAZOLE 500 MG/1
TABLET ORAL
Qty: 4 TAB | Refills: 0 | Status: SHIPPED | OUTPATIENT
Start: 2021-04-19 | End: 2021-05-10 | Stop reason: ALTCHOICE

## 2021-04-19 NOTE — PROGRESS NOTES
Spoke with patient advised that her swab was abnormal and positive for trich. Advised that prescription for flagyl 500 mg 2 tablets bid for 1 day will be sent to her pharmacy. Advised that partner must be treated. Scheduled her 3 month follow-up appt with provider.

## 2021-05-05 ENCOUNTER — TELEPHONE (OUTPATIENT)
Dept: OBGYN CLINIC | Age: 26
End: 2021-05-05

## 2021-05-05 NOTE — TELEPHONE ENCOUNTER
Patient contacted the office requesting results from last office visit. Returned her call left voicemail for patient to call back.

## 2021-05-10 DIAGNOSIS — A59.9 TRICHIMONIASIS: Primary | ICD-10-CM

## 2021-05-10 DIAGNOSIS — B37.31 CANDIDAL VULVOVAGINITIS: ICD-10-CM

## 2021-05-10 RX ORDER — METRONIDAZOLE 500 MG/1
500 TABLET ORAL 2 TIMES DAILY
Qty: 14 TAB | Refills: 0 | Status: SHIPPED | OUTPATIENT
Start: 2021-05-10 | End: 2021-05-17

## 2021-05-10 RX ORDER — FLUCONAZOLE 150 MG/1
150 TABLET ORAL DAILY
Qty: 1 TAB | Refills: 0 | Status: SHIPPED | OUTPATIENT
Start: 2021-05-11 | End: 2021-05-12

## 2021-05-10 NOTE — TELEPHONE ENCOUNTER
Spoke with pt she still has yellow discharge and itching. Spoke with Amanda Nance, will send Flagyl and Diflucan, test of cure scheduled. Partner had not been treated yet.

## 2021-06-08 ENCOUNTER — OFFICE VISIT (OUTPATIENT)
Dept: OBGYN CLINIC | Age: 26
End: 2021-06-08
Payer: MEDICAID

## 2021-06-08 VITALS
BODY MASS INDEX: 44.12 KG/M2 | OXYGEN SATURATION: 99 % | SYSTOLIC BLOOD PRESSURE: 128 MMHG | DIASTOLIC BLOOD PRESSURE: 75 MMHG | HEIGHT: 63 IN | HEART RATE: 103 BPM | TEMPERATURE: 98.6 F | RESPIRATION RATE: 16 BRPM | WEIGHT: 249 LBS

## 2021-06-08 DIAGNOSIS — Z11.3 SCREENING EXAMINATION FOR STD (SEXUALLY TRANSMITTED DISEASE): ICD-10-CM

## 2021-06-08 DIAGNOSIS — Z86.19 HISTORY OF TRICHOMONIASIS: Primary | ICD-10-CM

## 2021-06-08 DIAGNOSIS — N89.8 VAGINAL DISCHARGE: ICD-10-CM

## 2021-06-08 PROCEDURE — 99213 OFFICE O/P EST LOW 20 MIN: CPT | Performed by: OBSTETRICS & GYNECOLOGY

## 2021-06-08 NOTE — PROGRESS NOTES
Devin Corona is a 31 y.o. female who presents today for the following:  Chief Complaint   Patient presents with    Follow-up     Patient needs JENNIFER    Vaginitis     Patiet states that she is having vaginal irritation        Allergies   Allergen Reactions    Cat Hair Std Allergenic Ext Rash, Itching and Swelling       Current Outpatient Medications   Medication Sig    clotrimazole-betamethasone (LOTRISONE) topical cream Apply  to affected area two (2) times a day. (Patient not taking: Reported on 6/8/2021)    ethinyl estradiol-etonogestrel (NUVARING) 0.12-0.015 mg/24 hr vaginal ring Insert 1 ring intravaginally each month. On the fourth week of the month, remove the ring to allow for menses. Insert a new ring the following week. (Patient not taking: Reported on 6/8/2021)    glipiZIDE (GLUCOTROL) 5 mg tablet Take 1 Tab by mouth daily. (Patient not taking: Reported on 6/8/2021)     No current facility-administered medications for this visit. Past Medical History:   Diagnosis Date    Anemia of pregnancy 7/31/2020    Diabetes mellitus     Essential hypertension     Gestational diabetes     Gestational hypertension     Gestational hypertension, third trimester 11/19/2020    History of chlamydia     History of gonorrhea     IDDM (insulin dependent diabetes mellitus)     Nausea and vomiting 7/31/2020    Obesity     Polycystic disease, ovaries     Polycystic ovary syndrome 7/31/2020    Pregnant 7/31/2020    RhD negative 7/31/2020    Trichomonas vaginalis infection 07/31/2020    Type 2 diabetes mellitus without complication ([address]     Weight loss        History reviewed. No pertinent surgical history.     Family History   Problem Relation Age of Onset    Lung Disease Mother     Diabetes Mother     Diabetes Paternal Aunt     Diabetes Paternal Uncle     Heart Disease Maternal Grandmother     Hypertension Maternal Grandmother     Diabetes Father     Breast Cancer Maternal Aunt        Social History     Socioeconomic History    Marital status: SINGLE     Spouse name: Not on file    Number of children: Not on file    Years of education: Not on file    Highest education level: Not on file   Occupational History     Comment: Unemployed   Tobacco Use    Smoking status: Former Smoker    Smokeless tobacco: Never Used   Substance and Sexual Activity    Alcohol use: No    Drug use: No    Sexual activity: Yes   Other Topics Concern     Service Not Asked    Blood Transfusions Not Asked    Caffeine Concern Not Asked    Occupational Exposure Not Asked    Hobby Hazards Not Asked    Sleep Concern Not Asked    Stress Concern Not Asked    Weight Concern Not Asked    Special Diet Not Asked    Back Care Not Asked    Exercise Not Asked    Bike Helmet Not Asked    Seat Belt Not Asked    Self-Exams Not Asked   Social History Narrative    Not on file     Social Determinants of Health     Financial Resource Strain: Low Risk     Difficulty of Paying Living Expenses: Not hard at all   Food Insecurity: No Food Insecurity    Worried About Running Out of Food in the Last Year: Never true    Manuel of Food in the Last Year: Never true   Transportation Needs: No Transportation Needs    Lack of Transportation (Medical): No    Lack of Transportation (Non-Medical): No   Physical Activity:     Days of Exercise per Week:     Minutes of Exercise per Session:    Stress:     Feeling of Stress :    Social Connections:     Frequency of Communication with Friends and Family:     Frequency of Social Gatherings with Friends and Family:     Attends Yazidi Services:     Active Member of Clubs or Organizations:     Attends Club or Organization Meetings:     Marital Status:    Intimate Partner Violence:     Fear of Current or Ex-Partner:     Emotionally Abused:     Physically Abused:     Sexually Abused:          HPI  31years old patient who presented today requesting STD screening.   She has no complaints. ROS   Review of Systems   Constitutional: Negative. HENT: Negative. Eyes: Negative. Respiratory: Negative. Cardiovascular: Negative. Gastrointestinal: Negative. Genitourinary: Negative. Musculoskeletal: Negative. Skin: Negative. Neurological: Negative. Endo/Heme/Allergies: Negative. Psychiatric/Behavioral: Negative.       /75 (BP 1 Location: Left lower arm, BP Patient Position: Sitting, BP Cuff Size: Large adult)   Pulse (!) 103   Temp 98.6 °F (37 °C) (Temporal)   Resp 16   Ht 5' 3\" (1.6 m)   Wt 249 lb (112.9 kg)   SpO2 99%   BMI 44.11 kg/m²    OBGyn Exam   Constitutional  · Appearance: well-nourished, well developed, alert, in no acute distress    HENT  · Head and Face: appears normal    Neck  · Inspection/Palpation: normal appearance, no masses or tenderness  · Lymph Nodes: no lymphadenopathy present  · Thyroid: gland size normal, nontender, no nodules or masses present on palpation    Chest  · Respiratory Effort: Even and unlabored  · Auscultation: normal breath sounds    Cardiovascular  · Heart:  · Auscultation: regular rate and rhythm without murmur    Gastrointestinal  · Abdominal Examination: abdomen non-tender to palpation, normal bowel sounds, no masses present  · Liver and spleen: no hepatomegaly present, spleen not palpable  · Hernias: no hernias identified    Genitourinary  · External Genitalia: normal appearance for age, no discharge present, no tenderness present, no inflammatory lesions present, no masses present, no atrophy present  · Vagina: normal vaginal vault without central or paravaginal defects, no discharge present, no inflammatory lesions present, no masses present  · Bladder: non-tender to palpation  · Urethra: appears normal  · Cervix: normal   · Uterus: normal size, shape and consistency  · Adnexa: no adnexal tenderness present, no adnexal masses present  · Perineum: perineum within normal limits, no evidence of trauma, no rashes or skin lesions present  · Anus: anus within normal limits, no hemorrhoids present  · Inguinal Lymph Nodes: no lymphadenopathy present    Skin  · General Inspection: no rash, no lesions identified    Neurologic/Psychiatric  · Mental Status:  · Orientation: grossly oriented to person, place and time  · Mood and Affect: mood normal, affect appropriate    No results found for this visit on 06/08/21. Orders Placed This Encounter    NUSWAB VAGINITIS PLUS (VG+) WITH CANDIDA (SIX SPECIES)    RPR    HIV 1/2 AG/AB, 4TH GENERATION,W RFLX CONFIRM    HSV 1 AND 2-SPEC AB, IGG W/RFX TO SUPPL HSV-2 TESTING    HEP B SURFACE AG         1. History of trichomoniasis    - NUSWAB VAGINITIS PLUS (VG+) WITH CANDIDA (SIX SPECIES)    2. Vaginal discharge    - NUAB VAGINITIS PLUS (VG+) WITH CANDIDA (SIX SPECIES)    3. Screening examination for STD (sexually transmitted disease)    - RPR  - HIV 1/2 AG/AB, 4TH GENERATION,W RFLX CONFIRM  - HSV 1 AND 2-SPEC AB, IGG W/RFX TO SUPPL HSV-2 TESTING  - HEP B SURFACE AG        Follow-up and Dispositions    · Return if symptoms worsen or fail to improve.

## 2021-06-08 NOTE — PROGRESS NOTES
1. Have you been to the ER, urgent care clinic since your last visit? Hospitalized since your last visit? No    2. Have you seen or consulted any other health care providers outside of the [address] [place] [address] since your last visit? Include any pap smears or colon screening.  No   Chief Complaint   Patient presents with    Follow-up     Patient needs JENNIFER    Vaginitis     Diaz states that she is having vaginal irritation

## 2021-06-09 LAB
HBV SURFACE AG SERPL QL IA: NEGATIVE
HIV 1+2 AB+HIV1 P24 AG SERPL QL IA: NON REACTIVE
HSV1 IGG SER IA-ACNC: 19.8 INDEX (ref 0–0.9)
HSV2 IGG SER IA-ACNC: <0.91 INDEX (ref 0–0.9)
RPR SER QL: NON REACTIVE

## 2021-06-25 ENCOUNTER — TELEPHONE (OUTPATIENT)
Dept: OBGYN CLINIC | Age: 26
End: 2021-06-25

## 2021-06-25 DIAGNOSIS — B37.9 CANDIDIASIS: Primary | ICD-10-CM

## 2021-06-25 RX ORDER — FLUCONAZOLE 150 MG/1
150 TABLET ORAL DAILY
Qty: 1 TABLET | Refills: 1 | Status: SHIPPED | OUTPATIENT
Start: 2021-06-25 | End: 2021-06-26

## 2021-06-25 NOTE — TELEPHONE ENCOUNTER
Spoke to patient advised of positive for cold sores herpes type 1 which is prevalent. Also made aware of yeast present on swab and diflucan will be sent with 1 refill to her pharmacy. Confirmed pharmacy as [facility] on [facility].

## 2021-06-25 NOTE — TELEPHONE ENCOUNTER
----- Message from Argelia Sorensen MD sent at 6/10/2021  1:05 PM EDT -----  Please call the patient regarding her result. Positive only for type I herpes (cold sores) which is very prevalent.   Thank you

## 2022-03-18 PROBLEM — E28.2 POLYCYSTIC OVARY SYNDROME: Status: ACTIVE | Noted: 2020-07-31

## 2022-03-18 PROBLEM — Z98.891 STATUS POST PRIMARY LOW TRANSVERSE CESAREAN SECTION: Status: ACTIVE | Noted: 2020-12-29

## 2022-03-18 PROBLEM — A59.9 TRICHOMONAS INFECTION: Status: ACTIVE | Noted: 2020-07-31

## 2022-03-18 PROBLEM — L73.2 HIDRADENITIS SUPPURATIVA: Status: ACTIVE | Noted: 2021-01-22

## 2022-03-19 PROBLEM — Z67.91 RHD NEGATIVE: Status: ACTIVE | Noted: 2020-07-31

## 2023-05-23 RX ORDER — CLOTRIMAZOLE AND BETAMETHASONE DIPROPIONATE 10; .64 MG/G; MG/G
CREAM TOPICAL 2 TIMES DAILY
COMMUNITY
Start: 2021-04-14

## 2023-05-23 RX ORDER — GLIPIZIDE 5 MG/1
5 TABLET ORAL DAILY
COMMUNITY
Start: 2020-12-29

## 2023-05-23 RX ORDER — ETONOGESTREL AND ETHINYL ESTRADIOL 11.7; 2.7 MG/1; MG/1
INSERT, EXTENDED RELEASE VAGINAL
COMMUNITY
Start: 2021-01-22

## 2024-04-17 ENCOUNTER — OFFICE VISIT (OUTPATIENT)
Age: 29
End: 2024-04-17
Payer: MEDICAID

## 2024-04-17 VITALS
WEIGHT: 244.31 LBS | HEART RATE: 92 BPM | BODY MASS INDEX: 43.29 KG/M2 | TEMPERATURE: 97.8 F | SYSTOLIC BLOOD PRESSURE: 136 MMHG | OXYGEN SATURATION: 100 % | RESPIRATION RATE: 16 BRPM | HEIGHT: 63 IN | DIASTOLIC BLOOD PRESSURE: 92 MMHG

## 2024-04-17 DIAGNOSIS — Z12.4 CERVICAL CANCER SCREENING: ICD-10-CM

## 2024-04-17 DIAGNOSIS — Z11.3 SCREEN FOR STD (SEXUALLY TRANSMITTED DISEASE): ICD-10-CM

## 2024-04-17 DIAGNOSIS — Z01.419 ENCOUNTER FOR ANNUAL ROUTINE GYNECOLOGICAL EXAMINATION: Primary | ICD-10-CM

## 2024-04-17 PROCEDURE — 99395 PREV VISIT EST AGE 18-39: CPT | Performed by: OBSTETRICS & GYNECOLOGY

## 2024-04-17 RX ORDER — INSULIN ASPART 100 [IU]/ML
INJECTION, SOLUTION INTRAVENOUS; SUBCUTANEOUS
COMMUNITY
Start: 2024-03-10

## 2024-04-17 RX ORDER — INSULIN GLARGINE 100 [IU]/ML
INJECTION, SOLUTION SUBCUTANEOUS
COMMUNITY
Start: 2024-03-10

## 2024-04-17 NOTE — PROGRESS NOTES
Jakob is a 29 y.o. female who presents today for the following:    Chief Complaint   Patient presents with    Annual Exam     STD screening cultures and labs; was treated for Trich in ER 3 weeks ago; still has vaginal itching         Allergies   Allergen Reactions    Cat Hair Extract Itching, Rash and Swelling          Current Outpatient Medications:     LANTUS SOLOSTAR 100 UNIT/ML injection pen, INJECT 30 UNITS SUBCUTANEOUSLY AT BEDTIME, Disp: , Rfl:     NOVOLOG FLEXPEN 100 UNIT/ML injection pen, PLEASE SEE ATTACHED FOR DETAILED DIRECTIONS, Disp: , Rfl:     glipiZIDE (GLUCOTROL) 5 MG tablet, Take 1 tablet by mouth daily, Disp: , Rfl:     clotrimazole-betamethasone (LOTRISONE) 1-0.05 % cream, Apply topically 2 times daily, Disp: , Rfl:     etonogestrel-ethinyl estradiol (NUVARING) 0.12-0.015 MG/24HR vaginal ring, Insert 1 ring intravaginally each month.  On the fourth week of the month, remove the ring to allow for menses.  Insert a new ring the following week., Disp: , Rfl:      Past Medical History:   Diagnosis Date    Anemia of pregnancy 7/31/2020    Diabetes mellitus (HCC)     Essential hypertension     Gestational diabetes     Gestational hypertension     Gestational hypertension, third trimester 11/19/2020    History of chlamydia     History of gonorrhea     IDDM (insulin dependent diabetes mellitus)     Nausea and vomiting 7/31/2020    Obesity     Polycystic disease, ovaries     Polycystic ovary syndrome 7/31/2020    Pregnant 7/31/2020    RhD negative 7/31/2020    Trichomonas vaginalis infection 07/31/2020    Type 2 diabetes mellitus without complication (HCC)     Weight loss         History reviewed. No pertinent surgical history.     Family History   Problem Relation Age of Onset    Heart Disease Maternal Grandmother     Lung Disease Mother     Diabetes Paternal Aunt     Diabetes Mother     Breast Cancer Maternal Aunt     Diabetes Father     Diabetes Paternal Uncle     Hypertension Maternal Grandmother

## 2024-04-22 LAB
., LABCORP: NORMAL
CYTOLOGIST CVX/VAG CYTO: NORMAL
CYTOLOGY CVX/VAG DOC CYTO: NORMAL
CYTOLOGY CVX/VAG DOC THIN PREP: NORMAL
DX ICD CODE: NORMAL
Lab: NORMAL
OTHER STN SPEC: NORMAL
STAT OF ADQ CVX/VAG CYTO-IMP: NORMAL

## 2024-04-25 ENCOUNTER — TELEPHONE (OUTPATIENT)
Age: 29
End: 2024-04-25

## 2024-04-25 DIAGNOSIS — A59.9 TRICHOMONIASIS: Primary | ICD-10-CM

## 2024-04-25 LAB
A VAGINAE DNA VAG QL NAA+PROBE: ABNORMAL SCORE
BVAB2 DNA VAG QL NAA+PROBE: ABNORMAL SCORE
C ALBICANS DNA VAG QL NAA+PROBE: NEGATIVE
C GLABRATA DNA VAG QL NAA+PROBE: POSITIVE
C KRUSEI DNA VAG QL NAA+PROBE: NEGATIVE
C LUSITANIAE DNA VAG QL NAA+PROBE: NEGATIVE
C TRACH DNA VAG QL NAA+PROBE: NEGATIVE
CANDIDA DNA VAG QL NAA+PROBE: NEGATIVE
HBV SURFACE AG SERPL QL IA: NEGATIVE
HCV IGG SERPL QL IA: NON REACTIVE
HIV 1+2 AB+HIV1 P24 AG SERPL QL IA: NON REACTIVE
HSV1 IGG SER IA-ACNC: 34.8 INDEX (ref 0–0.9)
HSV2 IGG SER IA-ACNC: <0.91 INDEX (ref 0–0.9)
MEGA1 DNA VAG QL NAA+PROBE: ABNORMAL SCORE
N GONORRHOEA DNA VAG QL NAA+PROBE: NEGATIVE
T VAGINALIS DNA VAG QL NAA+PROBE: POSITIVE
TREPONEMA PALLIDUM IGG+IGM AB [PRESENCE] IN SERUM OR PLASMA BY IMMUNOASSAY: NON REACTIVE

## 2024-04-25 RX ORDER — METRONIDAZOLE 500 MG/1
500 TABLET ORAL 2 TIMES DAILY
Qty: 14 TABLET | Refills: 0 | Status: SHIPPED | OUTPATIENT
Start: 2024-04-25 | End: 2024-05-02

## 2024-04-25 NOTE — TELEPHONE ENCOUNTER
----- Message from Calixto Akers MD sent at 4/25/2024  3:14 PM EDT -----  Positive for trichomoniasis.  Please send prescription for Flagyl 500 mg 1 tab p.o. twice daily for 7 days and advise patient to complete.  Please orient patient about the importance of partner treatment.  Follow-up testing recommended in 3 months, please schedule an appointment.  Thank you

## 2024-04-25 NOTE — TELEPHONE ENCOUNTER
Spoke with patient advised of Trich and bv.  She is aware that flagyl will treat both.  She reports she took flagyl prior to seeing us and took 4 pills at once then bid for 10 days and she and boyfriend after being treated were still having symptoms. Would like to know the next step.  Will update provider and call patient back to schedule follow-up tomorrow.

## 2024-04-26 NOTE — TELEPHONE ENCOUNTER
Notified patient of providers. Reports has not had sex after initial treatment is no longer dealing with the partner. Reports no sex or new partners.

## 2024-08-17 ENCOUNTER — HOSPITAL ENCOUNTER (EMERGENCY)
Facility: HOSPITAL | Age: 29
Discharge: HOME | End: 2024-08-17
Payer: MEDICAID

## 2024-08-17 VITALS
DIASTOLIC BLOOD PRESSURE: 87 MMHG | SYSTOLIC BLOOD PRESSURE: 140 MMHG | BODY MASS INDEX: 43.41 KG/M2 | WEIGHT: 245 LBS | HEIGHT: 63 IN | RESPIRATION RATE: 16 BRPM | OXYGEN SATURATION: 100 % | TEMPERATURE: 99.1 F | HEART RATE: 98 BPM

## 2024-08-17 DIAGNOSIS — L02.215 PERINEAL ABSCESS: Primary | ICD-10-CM

## 2024-08-17 PROCEDURE — 87076 CULTURE ANAEROBE IDENT EACH: CPT

## 2024-08-17 PROCEDURE — 87070 CULTURE OTHR SPECIMN AEROBIC: CPT

## 2024-08-17 PROCEDURE — 87147 CULTURE TYPE IMMUNOLOGIC: CPT

## 2024-08-17 PROCEDURE — 56405 I&D VULVA/PERINEAL ABSCESS: CPT

## 2024-08-17 PROCEDURE — 87205 SMEAR GRAM STAIN: CPT

## 2024-08-17 PROCEDURE — 6370000000 HC RX 637 (ALT 250 FOR IP): Performed by: PHYSICIAN ASSISTANT

## 2024-08-17 PROCEDURE — 99283 EMERGENCY DEPT VISIT LOW MDM: CPT

## 2024-08-17 PROCEDURE — 87185 SC STD ENZYME DETCJ PER NZM: CPT

## 2024-08-17 RX ORDER — DOXYCYCLINE HYCLATE 100 MG
100 TABLET ORAL 2 TIMES DAILY
Qty: 14 TABLET | Refills: 0 | Status: SHIPPED | OUTPATIENT
Start: 2024-08-17 | End: 2024-08-24

## 2024-08-17 RX ORDER — DOXYCYCLINE 100 MG/1
100 CAPSULE ORAL
Status: COMPLETED | OUTPATIENT
Start: 2024-08-17 | End: 2024-08-17

## 2024-08-17 RX ORDER — HYDROCODONE BITARTRATE AND ACETAMINOPHEN 7.5; 325 MG/1; MG/1
1 TABLET ORAL 2 TIMES DAILY
Qty: 10 TABLET | Refills: 0 | Status: SHIPPED | OUTPATIENT
Start: 2024-08-17 | End: 2024-08-22

## 2024-08-17 RX ORDER — ONDANSETRON 4 MG/1
4 TABLET, ORALLY DISINTEGRATING ORAL
Status: COMPLETED | OUTPATIENT
Start: 2024-08-17 | End: 2024-08-17

## 2024-08-17 RX ORDER — ONDANSETRON 4 MG/1
4 TABLET, ORALLY DISINTEGRATING ORAL EVERY 8 HOURS PRN
Qty: 12 TABLET | Refills: 0 | Status: SHIPPED | OUTPATIENT
Start: 2024-08-17

## 2024-08-17 RX ORDER — OXYCODONE HYDROCHLORIDE AND ACETAMINOPHEN 5; 325 MG/1; MG/1
1 TABLET ORAL
Status: COMPLETED | OUTPATIENT
Start: 2024-08-17 | End: 2024-08-17

## 2024-08-17 RX ADMIN — OXYCODONE HYDROCHLORIDE AND ACETAMINOPHEN 1 TABLET: 5; 325 TABLET ORAL at 14:52

## 2024-08-17 RX ADMIN — DOXYCYCLINE 100 MG: 100 CAPSULE ORAL at 14:51

## 2024-08-17 RX ADMIN — ONDANSETRON 4 MG: 4 TABLET, ORALLY DISINTEGRATING ORAL at 14:54

## 2024-08-17 ASSESSMENT — PAIN DESCRIPTION - ORIENTATION: ORIENTATION: LEFT

## 2024-08-17 ASSESSMENT — PAIN SCALES - GENERAL: PAINLEVEL_OUTOF10: 10

## 2024-08-17 ASSESSMENT — PAIN DESCRIPTION - PAIN TYPE: TYPE: ACUTE PAIN

## 2024-08-17 ASSESSMENT — PAIN DESCRIPTION - FREQUENCY: FREQUENCY: INTERMITTENT

## 2024-08-17 ASSESSMENT — PAIN - FUNCTIONAL ASSESSMENT: PAIN_FUNCTIONAL_ASSESSMENT: 0-10

## 2024-08-17 ASSESSMENT — PAIN DESCRIPTION - LOCATION: LOCATION: GROIN

## 2024-08-17 NOTE — DISCHARGE INSTRUCTIONS
Remove packing in 48 hours.     After packing removal, perform SITZ baths in which you fill bathtub with warm tap water and soak area three times a day for 10 minutes until healed.     Finish antibiotics.

## 2024-08-17 NOTE — ED PROVIDER NOTES
[facility] EMERGENCY DEPT  EMERGENCY DEPARTMENT ENCOUNTER       Pt Name: Jakob Castro  MRN: [id number]  Birthdate 1995  Date of evaluation: 8/17/2024  PCP: Violetta, Pcp  Note Started: 3:40 PM 8/17/24     CHIEF COMPLAINT       Chief Complaint   Patient presents with    Abscess        HISTORY OF PRESENT ILLNESS: 1 or more elements      History From: Patient  HPI Limitations: None  Chronic Conditions: anemia, PCOS, HTN, DM  Social Determinants affecting Dx or Tx: none      Jakob Castro is a 29 y.o. female who presents to ED c/o perineal abscess. Pain with palpation. No drainage. Sxs x several days. Hx of abscess at this site. Pt has hx of DM. No fever, chills, nausea, vomiting.      Nursing Notes were all reviewed and agreed with or any disagreements were addressed in the HPI.    PAST HISTORY     Past Medical History:  Past Medical History:   Diagnosis Date    Anemia of pregnancy 7/31/2020    Diabetes mellitus (HCC)     Essential hypertension     Gestational diabetes     Gestational hypertension     Gestational hypertension, third trimester 11/19/2020    History of chlamydia     History of gonorrhea     IDDM (insulin dependent diabetes mellitus)     Nausea and vomiting 7/31/2020    Obesity     Polycystic disease, ovaries     Polycystic ovary syndrome 7/31/2020    Pregnant 7/31/2020    RhD negative 7/31/2020    Trichomonas vaginalis infection 07/31/2020    Type 2 diabetes mellitus without complication (HCC)     Weight loss        Past Surgical History:  History reviewed. No pertinent surgical history.    Family History:  Family History   Problem Relation Age of Onset    Heart Disease Maternal Grandmother     Lung Disease Mother     Diabetes Paternal Aunt     Diabetes Mother     Breast Cancer Maternal Aunt     Diabetes Father     Diabetes Paternal Uncle     Hypertension Maternal Grandmother        Social History:  Social History     Socioeconomic History    Marital status: Single     Spouse name: None    Number of children:  NOVOLOG FLEXPEN 100 UNIT/ML injection pen PLEASE SEE ATTACHED FOR DETAILED DIRECTIONS      clotrimazole-betamethasone (LOTRISONE) 1-0.05 % cream Apply topically 2 times daily      etonogestrel-ethinyl estradiol (NUVARING) 0.12-0.015 MG/24HR vaginal ring Insert 1 ring intravaginally each month.  On the fourth week of the month, remove the ring to allow for menses.  Insert a new ring the following week.      glipiZIDE (GLUCOTROL) 5 MG tablet Take 1 tablet by mouth daily            PHYSICAL EXAM      Vitals:    08/17/24 1350   BP: (!) 140/87   Pulse: 98   Resp: 16   Temp: 99.1 °F (37.3 °C)   TempSrc: Oral   SpO2: 100%   Weight: 111.1 kg (245 lb)   Height: 1.6 m (5' 3\")     Physical Exam  Vitals and nursing note reviewed. Exam conducted with a chaperone present.   Constitutional:       General: She is not in acute distress.     Appearance: Normal appearance.      Comments: AA female in NAD. Alert. Sister at bedside   HENT:      Head: Normocephalic and atraumatic.      Right Ear: External ear normal.      Left Ear: External ear normal.      Nose: Nose normal.   Eyes:      Conjunctiva/sclera: Conjunctivae normal.   Cardiovascular:      Rate and Rhythm: Normal rate and regular rhythm.      Heart sounds: Normal heart sounds. No murmur heard.     No friction rub. No gallop.   Pulmonary:      Effort: Pulmonary effort is normal. No respiratory distress.   Genitourinary:         Comments: 3 cm x 3 cm area of tenderness, swelling, and erythema to left perineal area. No drainage. Fluctuant and indurated.   Musculoskeletal:      Cervical back: Normal range of motion.   Neurological:      Mental Status: She is alert and oriented to person, place, and time.   Psychiatric:         Behavior: Behavior normal.              DIAGNOSTIC RESULTS   LABS:    No results found for this or any previous visit (from the past 24 hour(s)).    Labs Reviewed   CULTURE, WOUND         EKG: When ordered, EKG's are interpreted by the Emergency Department

## 2024-08-18 LAB
BACTERIA SPEC CULT: NORMAL
GRAM STN SPEC: NORMAL
SERVICE CMNT-IMP: NORMAL

## 2024-08-22 LAB
BACTERIA SPEC CULT: ABNORMAL
GRAM STN SPEC: ABNORMAL
SERVICE CMNT-IMP: ABNORMAL

## 2024-08-22 NOTE — ED NOTES
3:50 PM EDT   8/22/24     Called patient about wound culture. Pt feeling better. Sxs resolved. No indication for change in ABX     Amy Christianson PA-C  08/22/24 [id number]

## (undated) DEVICE — TRAY URIN CATH PED 16FR BLLN 5CC INDWL STR TIP INF CTRL

## (undated) DEVICE — SUT MONOCRYL PLUS UD 4-0 --

## (undated) DEVICE — SUTURE PLN GUT SZ 2-0 L27IN ABSRB YELLOWISH TAN L70MM XLH 53T

## (undated) DEVICE — DRAPE C-SECTION W/WINDOW --

## (undated) DEVICE — HANDLE SURG LIGHT OB

## (undated) DEVICE — REM POLYHESIVE ADULT PATIENT RETURN ELECTRODE: Brand: VALLEYLAB

## (undated) DEVICE — C-SECTION: Brand: MEDLINE INDUSTRIES, INC.

## (undated) DEVICE — SPONGE DRAIN NONWOVEN 4X4IN -- 2/PK

## (undated) DEVICE — SOL IRR SOD CL 0.9% 1000ML BTL --

## (undated) DEVICE — SUT VCRL + 2-0 36IN CT1 UD --

## (undated) DEVICE — STERILE POLYISOPRENE POWDER-FREE SURGICAL GLOVES WITH EMOLLIENT COATING: Brand: PROTEXIS

## (undated) DEVICE — SUTURE VCRL SZ 0 L36IN ABSRB UD L40MM CT 1/2 CIR TAPERPOINT J958H

## (undated) DEVICE — BAG,SPONGE COUNTER,CLEAR,50/BX,5BX/CS: Brand: MEDLINE

## (undated) DEVICE — FORCEPS TISS L6.25IN S STL ADAIR STYL

## (undated) DEVICE — APPLICATOR SCRB 26ML TEAL STRL -- CHLORAPREP 26ML